# Patient Record
Sex: MALE
[De-identification: names, ages, dates, MRNs, and addresses within clinical notes are randomized per-mention and may not be internally consistent; named-entity substitution may affect disease eponyms.]

---

## 2017-02-09 ENCOUNTER — LAB SERVICES (OUTPATIENT)
Dept: OTHER | Age: 32
End: 2017-02-09

## 2017-02-09 ENCOUNTER — CHARTING TRANS (OUTPATIENT)
Dept: OTHER | Age: 32
End: 2017-02-09

## 2017-02-09 LAB — RAPID STREP GROUP A: POSITIVE

## 2017-03-20 ENCOUNTER — OFFICE VISIT (OUTPATIENT)
Dept: INTERNAL MEDICINE CLINIC | Facility: CLINIC | Age: 32
End: 2017-03-20

## 2017-03-20 VITALS
HEART RATE: 84 BPM | HEIGHT: 73 IN | SYSTOLIC BLOOD PRESSURE: 134 MMHG | WEIGHT: 315 LBS | DIASTOLIC BLOOD PRESSURE: 84 MMHG | BODY MASS INDEX: 41.75 KG/M2

## 2017-03-20 DIAGNOSIS — I10 ESSENTIAL HYPERTENSION: Primary | ICD-10-CM

## 2017-03-20 DIAGNOSIS — E66.01 MORBID OBESITY, UNSPECIFIED OBESITY TYPE (HCC): ICD-10-CM

## 2017-03-20 DIAGNOSIS — N20.0 RECURRENT KIDNEY STONES: ICD-10-CM

## 2017-03-20 PROCEDURE — 99213 OFFICE O/P EST LOW 20 MIN: CPT | Performed by: INTERNAL MEDICINE

## 2017-03-20 PROCEDURE — 99212 OFFICE O/P EST SF 10 MIN: CPT | Performed by: INTERNAL MEDICINE

## 2017-03-20 RX ORDER — AMLODIPINE BESYLATE 10 MG/1
10 TABLET ORAL DAILY
Qty: 30 TABLET | Refills: 5 | Status: SHIPPED | OUTPATIENT
Start: 2017-03-20 | End: 2017-09-18

## 2017-03-20 RX ORDER — LOSARTAN POTASSIUM 100 MG/1
100 TABLET ORAL DAILY
Qty: 30 TABLET | Refills: 5 | Status: SHIPPED | OUTPATIENT
Start: 2017-03-20 | End: 2017-09-18

## 2017-03-20 NOTE — PATIENT INSTRUCTIONS
Please continue your current medications. Please obtain blood tests soon when you can. Please try to watch your diet, exercise regularly, and lose weight. Return visit in 6 months.

## 2017-03-20 NOTE — PROGRESS NOTES
Aviva Mart. is a 36year old male. Patient presents with:  Hypertension    HPI:   Mr. Kenny Gilman presents this afternoon for follow-up of hypertension. He has not been able to obtain previous medical records. \"I feel great. \"  No out of office Comp Metabolic Panel (14); Future    2. Morbid obesity, unspecified obesity type ([address]  Lifestyle advice as above. - Lipid Panel; Future  - TSH W Reflex; Future    3. Recurrent kidney stones  Currently inactive. Await labs. - Uric Acid;  Future      The

## 2017-04-28 ENCOUNTER — APPOINTMENT (OUTPATIENT)
Dept: LAB | Facility: HOSPITAL | Age: 32
End: 2017-04-28
Attending: INTERNAL MEDICINE
Payer: COMMERCIAL

## 2017-04-28 DIAGNOSIS — I10 ESSENTIAL HYPERTENSION: ICD-10-CM

## 2017-04-28 DIAGNOSIS — E66.01 MORBID OBESITY, UNSPECIFIED OBESITY TYPE (HCC): ICD-10-CM

## 2017-04-28 DIAGNOSIS — N20.0 RECURRENT KIDNEY STONES: ICD-10-CM

## 2017-04-28 PROBLEM — E78.5 DYSLIPIDEMIA: Status: ACTIVE | Noted: 2017-04-28

## 2017-04-28 PROCEDURE — 84443 ASSAY THYROID STIM HORMONE: CPT

## 2017-04-28 PROCEDURE — 36415 COLL VENOUS BLD VENIPUNCTURE: CPT

## 2017-04-28 PROCEDURE — 80061 LIPID PANEL: CPT

## 2017-04-28 PROCEDURE — 84550 ASSAY OF BLOOD/URIC ACID: CPT

## 2017-04-28 PROCEDURE — 80053 COMPREHEN METABOLIC PANEL: CPT

## 2017-08-04 ENCOUNTER — OFFICE VISIT (OUTPATIENT)
Dept: INTERNAL MEDICINE CLINIC | Facility: CLINIC | Age: 32
End: 2017-08-04

## 2017-08-04 ENCOUNTER — LAB ENCOUNTER (OUTPATIENT)
Dept: LAB | Age: 32
End: 2017-08-04
Attending: INTERNAL MEDICINE
Payer: COMMERCIAL

## 2017-08-04 ENCOUNTER — TELEPHONE (OUTPATIENT)
Dept: INTERNAL MEDICINE CLINIC | Facility: CLINIC | Age: 32
End: 2017-08-04

## 2017-08-04 VITALS
HEART RATE: 92 BPM | BODY MASS INDEX: 49 KG/M2 | WEIGHT: 315 LBS | SYSTOLIC BLOOD PRESSURE: 155 MMHG | DIASTOLIC BLOOD PRESSURE: 106 MMHG

## 2017-08-04 DIAGNOSIS — R53.83 OTHER FATIGUE: Primary | ICD-10-CM

## 2017-08-04 DIAGNOSIS — M79.10 MYALGIA: ICD-10-CM

## 2017-08-04 DIAGNOSIS — R53.83 OTHER FATIGUE: ICD-10-CM

## 2017-08-04 DIAGNOSIS — F41.9 ANXIETY: ICD-10-CM

## 2017-08-04 LAB
ALBUMIN SERPL BCP-MCNC: 4.1 G/DL (ref 3.5–4.8)
ALBUMIN/GLOB SERPL: 1.2 {RATIO} (ref 1–2)
ALP SERPL-CCNC: 70 U/L (ref 32–100)
ALT SERPL-CCNC: 53 U/L (ref 17–63)
ANION GAP SERPL CALC-SCNC: 5 MMOL/L (ref 0–18)
AST SERPL-CCNC: 28 U/L (ref 15–41)
BASOPHILS # BLD: 0.1 K/UL (ref 0–0.2)
BASOPHILS NFR BLD: 1 %
BILIRUB SERPL-MCNC: 1 MG/DL (ref 0.3–1.2)
BUN SERPL-MCNC: 12 MG/DL (ref 8–20)
BUN/CREAT SERPL: 14.1 (ref 10–20)
CALCIUM SERPL-MCNC: 9.3 MG/DL (ref 8.5–10.5)
CHLORIDE SERPL-SCNC: 106 MMOL/L (ref 95–110)
CK SERPL-CCNC: 104 U/L (ref 49–397)
CO2 SERPL-SCNC: 29 MMOL/L (ref 22–32)
CREAT SERPL-MCNC: 0.85 MG/DL (ref 0.5–1.5)
EOSINOPHIL # BLD: 0.1 K/UL (ref 0–0.7)
EOSINOPHIL NFR BLD: 1 %
ERYTHROCYTE [DISTWIDTH] IN BLOOD BY AUTOMATED COUNT: 13.2 % (ref 11–15)
GLOBULIN PLAS-MCNC: 3.3 G/DL (ref 2.5–3.7)
GLUCOSE SERPL-MCNC: 166 MG/DL (ref 70–99)
HCT VFR BLD AUTO: 46.6 % (ref 41–52)
HGB BLD-MCNC: 15.8 G/DL (ref 13.5–17.5)
LYMPHOCYTES # BLD: 2.8 K/UL (ref 1–4)
LYMPHOCYTES NFR BLD: 30 %
MCH RBC QN AUTO: 31.6 PG (ref 27–32)
MCHC RBC AUTO-ENTMCNC: 34 G/DL (ref 32–37)
MCV RBC AUTO: 92.7 FL (ref 80–100)
MONOCYTES # BLD: 0.5 K/UL (ref 0–1)
MONOCYTES NFR BLD: 6 %
NEUTROPHILS # BLD AUTO: 5.9 K/UL (ref 1.8–7.7)
NEUTROPHILS NFR BLD: 63 %
OSMOLALITY UR CALC.SUM OF ELEC: 294 MOSM/KG (ref 275–295)
PLATELET # BLD AUTO: 201 K/UL (ref 140–400)
PMV BLD AUTO: 9.5 FL (ref 7.4–10.3)
POTASSIUM SERPL-SCNC: 4.2 MMOL/L (ref 3.3–5.1)
PROT SERPL-MCNC: 7.4 G/DL (ref 5.9–8.4)
RBC # BLD AUTO: 5.02 M/UL (ref 4.5–5.9)
SODIUM SERPL-SCNC: 140 MMOL/L (ref 136–144)
WBC # BLD AUTO: 9.4 K/UL (ref 4–11)

## 2017-08-04 PROCEDURE — 82550 ASSAY OF CK (CPK): CPT

## 2017-08-04 PROCEDURE — 99212 OFFICE O/P EST SF 10 MIN: CPT | Performed by: INTERNAL MEDICINE

## 2017-08-04 PROCEDURE — 80053 COMPREHEN METABOLIC PANEL: CPT

## 2017-08-04 PROCEDURE — 36415 COLL VENOUS BLD VENIPUNCTURE: CPT

## 2017-08-04 PROCEDURE — 85025 COMPLETE CBC W/AUTO DIFF WBC: CPT

## 2017-08-04 PROCEDURE — 99214 OFFICE O/P EST MOD 30 MIN: CPT | Performed by: INTERNAL MEDICINE

## 2017-08-04 RX ORDER — CLONAZEPAM 0.5 MG/1
0.5 TABLET ORAL 2 TIMES DAILY PRN
Qty: 20 TABLET | Refills: 0 | Status: SHIPPED | OUTPATIENT
Start: 2017-08-04 | End: 2019-01-18

## 2017-08-04 NOTE — TELEPHONE ENCOUNTER
Actions Requested: Office visit  Problem: fatigue  Onset and Timin week  Associated Symptoms: fatigue, achey joints  Aggravating by: n/a  Alleviated by: n/a  Triage Note: Patient states for about 1 week he has been feeling fatigued and weak, \"rundown\ vagina)  * Bloody, black, or tarry bowel movements  * Moderate weakness from poor fluid intake with no improvement after 2 hours of rest and fluids  * Drinking very little and dehydration suspected (e.g., no urine > 12 hours, very dry mouth, very lighthead

## 2017-08-04 NOTE — PROGRESS NOTES
HPI:    Patient ID: Citlalli Rossi. is a 36year old male. Patient complains of weakness and fatigue for 1 week. Denies fevers or chills but feels achy all over. Joints and muscles. No nausea vomiting. No change in bowel habits. No trauma. Creatinine) (E)    Meds This Visit:  Signed Prescriptions Disp Refills    ClonazePAM 0.5 MG Oral Tab 20 tablet 0      Sig: Take 1 tablet (0.5 mg total) by mouth 2 (two) times daily as needed for Anxiety.            Imaging & Referrals:  None       [id number]

## 2017-08-08 ENCOUNTER — TELEPHONE (OUTPATIENT)
Dept: FAMILY MEDICINE CLINIC | Facility: CLINIC | Age: 32
End: 2017-08-08

## 2017-08-08 DIAGNOSIS — R73.09 ABNORMAL GLUCOSE LEVEL: Primary | ICD-10-CM

## 2017-08-14 NOTE — TELEPHONE ENCOUNTER
Collected:  8/4/2017  1:56 PM Status:  Final result Dx:  Myalgia   Notes Recorded by Jorje Barrientos MD on 8/11/2017 at 4:45 PM CDT  Labs good except blood sugar mild to moderately elevated.  Repeat FBS and A1c. Sameer Wong               Pt called, message per Dr lao

## 2017-08-18 ENCOUNTER — APPOINTMENT (OUTPATIENT)
Dept: LAB | Facility: HOSPITAL | Age: 32
End: 2017-08-18
Attending: PATHOLOGY
Payer: COMMERCIAL

## 2017-08-18 DIAGNOSIS — R73.09 ABNORMAL GLUCOSE LEVEL: ICD-10-CM

## 2017-08-18 LAB
GLUCOSE SERPL-MCNC: 164 MG/DL (ref 70–99)
HBA1C MFR BLD: 8.1 % (ref 4–6)

## 2017-08-18 PROCEDURE — 83036 HEMOGLOBIN GLYCOSYLATED A1C: CPT

## 2017-08-18 PROCEDURE — 36415 COLL VENOUS BLD VENIPUNCTURE: CPT

## 2017-08-18 PROCEDURE — 82947 ASSAY GLUCOSE BLOOD QUANT: CPT

## 2017-09-18 ENCOUNTER — OFFICE VISIT (OUTPATIENT)
Dept: INTERNAL MEDICINE CLINIC | Facility: CLINIC | Age: 32
End: 2017-09-18

## 2017-09-18 VITALS
DIASTOLIC BLOOD PRESSURE: 99 MMHG | SYSTOLIC BLOOD PRESSURE: 148 MMHG | HEART RATE: 97 BPM | BODY MASS INDEX: 50 KG/M2 | WEIGHT: 315 LBS

## 2017-09-18 DIAGNOSIS — E66.01 MORBID OBESITY (HCC): ICD-10-CM

## 2017-09-18 DIAGNOSIS — I10 ESSENTIAL HYPERTENSION: Primary | ICD-10-CM

## 2017-09-18 PROCEDURE — 99212 OFFICE O/P EST SF 10 MIN: CPT | Performed by: INTERNAL MEDICINE

## 2017-09-18 PROCEDURE — 99214 OFFICE O/P EST MOD 30 MIN: CPT | Performed by: INTERNAL MEDICINE

## 2017-09-18 RX ORDER — AMLODIPINE BESYLATE 10 MG/1
10 TABLET ORAL DAILY
Qty: 30 TABLET | Refills: 5 | Status: SHIPPED | OUTPATIENT
Start: 2017-09-18 | End: 2018-08-31

## 2017-09-18 RX ORDER — LOSARTAN POTASSIUM 100 MG/1
100 TABLET ORAL DAILY
Qty: 30 TABLET | Refills: 5 | Status: SHIPPED | OUTPATIENT
Start: 2017-09-18 | End: 2018-08-31

## 2017-09-18 NOTE — PROGRESS NOTES
HPI:    Patient ID: Yessy Yun is a 36year old male. HTN   This is a chronic problem. The current episode started more than 1 year ago. The problem has been gradually improving (home bp readings are within goal) since onset.  The problem is c by mouth 2 (two) times daily as needed for Anxiety. Disp: 20 tablet Rfl: 0     Allergies:No Known Allergies       09/18/17  1603   BP: 148/99   Pulse: 97   Weight: (!) 377 lb (171 kg)         Body mass index is 49.74 kg/m².     PHYSICAL EXAM:   Physical Exa

## 2018-03-28 RX ORDER — LOSARTAN POTASSIUM 100 MG/1
TABLET ORAL
Qty: 30 TABLET | Refills: 4 | OUTPATIENT
Start: 2018-03-28

## 2018-03-28 RX ORDER — AMLODIPINE BESYLATE 10 MG/1
TABLET ORAL
Qty: 30 TABLET | Refills: 4 | OUTPATIENT
Start: 2018-03-28

## 2018-08-31 ENCOUNTER — OFFICE VISIT (OUTPATIENT)
Dept: INTERNAL MEDICINE CLINIC | Facility: CLINIC | Age: 33
End: 2018-08-31
Payer: COMMERCIAL

## 2018-08-31 ENCOUNTER — APPOINTMENT (OUTPATIENT)
Dept: LAB | Age: 33
End: 2018-08-31
Attending: INTERNAL MEDICINE
Payer: COMMERCIAL

## 2018-08-31 VITALS
WEIGHT: 315 LBS | DIASTOLIC BLOOD PRESSURE: 83 MMHG | BODY MASS INDEX: 49 KG/M2 | HEART RATE: 94 BPM | SYSTOLIC BLOOD PRESSURE: 148 MMHG | TEMPERATURE: 99 F

## 2018-08-31 DIAGNOSIS — I10 ESSENTIAL HYPERTENSION: ICD-10-CM

## 2018-08-31 DIAGNOSIS — I10 ESSENTIAL HYPERTENSION: Primary | ICD-10-CM

## 2018-08-31 DIAGNOSIS — R73.9 HYPERGLYCEMIA: ICD-10-CM

## 2018-08-31 DIAGNOSIS — E66.01 MORBID OBESITY (HCC): ICD-10-CM

## 2018-08-31 DIAGNOSIS — E78.5 DYSLIPIDEMIA: ICD-10-CM

## 2018-08-31 LAB
ALBUMIN SERPL BCP-MCNC: 3.9 G/DL (ref 3.5–4.8)
ALBUMIN/GLOB SERPL: 1.1 {RATIO} (ref 1–2)
ALP SERPL-CCNC: 74 U/L (ref 32–100)
ALT SERPL-CCNC: 47 U/L (ref 17–63)
ANION GAP SERPL CALC-SCNC: 7 MMOL/L (ref 0–18)
AST SERPL-CCNC: 27 U/L (ref 15–41)
BILIRUB SERPL-MCNC: 0.9 MG/DL (ref 0.3–1.2)
BUN SERPL-MCNC: 14 MG/DL (ref 8–20)
BUN/CREAT SERPL: 14.7 (ref 10–20)
CALCIUM SERPL-MCNC: 9.4 MG/DL (ref 8.5–10.5)
CHLORIDE SERPL-SCNC: 103 MMOL/L (ref 95–110)
CHOLEST SERPL-MCNC: 158 MG/DL (ref 110–200)
CO2 SERPL-SCNC: 28 MMOL/L (ref 22–32)
CREAT SERPL-MCNC: 0.95 MG/DL (ref 0.5–1.5)
GLOBULIN PLAS-MCNC: 3.6 G/DL (ref 2.5–3.7)
GLUCOSE SERPL-MCNC: 243 MG/DL (ref 70–99)
HDLC SERPL-MCNC: 34 MG/DL
LDLC SERPL CALC-MCNC: 104 MG/DL (ref 0–99)
NONHDLC SERPL-MCNC: 124 MG/DL
OSMOLALITY UR CALC.SUM OF ELEC: 295 MOSM/KG (ref 275–295)
PATIENT FASTING: YES
POTASSIUM SERPL-SCNC: 4.6 MMOL/L (ref 3.3–5.1)
PROT SERPL-MCNC: 7.5 G/DL (ref 5.9–8.4)
SODIUM SERPL-SCNC: 138 MMOL/L (ref 136–144)
TRIGL SERPL-MCNC: 102 MG/DL (ref 1–149)
TSH SERPL-ACNC: 0.74 UIU/ML (ref 0.45–5.33)

## 2018-08-31 PROCEDURE — 83036 HEMOGLOBIN GLYCOSYLATED A1C: CPT

## 2018-08-31 PROCEDURE — 36415 COLL VENOUS BLD VENIPUNCTURE: CPT

## 2018-08-31 PROCEDURE — 99212 OFFICE O/P EST SF 10 MIN: CPT | Performed by: INTERNAL MEDICINE

## 2018-08-31 PROCEDURE — 99214 OFFICE O/P EST MOD 30 MIN: CPT | Performed by: INTERNAL MEDICINE

## 2018-08-31 PROCEDURE — 84443 ASSAY THYROID STIM HORMONE: CPT

## 2018-08-31 PROCEDURE — 80053 COMPREHEN METABOLIC PANEL: CPT

## 2018-08-31 PROCEDURE — 80061 LIPID PANEL: CPT

## 2018-08-31 RX ORDER — LOSARTAN POTASSIUM 100 MG/1
100 TABLET ORAL DAILY
Qty: 90 TABLET | Refills: 1 | Status: SHIPPED | OUTPATIENT
Start: 2018-08-31 | End: 2019-03-08

## 2018-08-31 RX ORDER — AMLODIPINE BESYLATE 10 MG/1
10 TABLET ORAL DAILY
Qty: 90 TABLET | Refills: 1 | Status: SHIPPED | OUTPATIENT
Start: 2018-08-31 | End: 2019-03-08

## 2018-08-31 NOTE — H&P
[facility]    History and Physical    Valeria Rodriguez. Patient Status:  Outpatient    10/15/1985 MRN [id number]   Location [address] Attending No att. providers found   [facility] Day # 0 PCP LIVAN Yun 0.99 04/28/2017    08/04/2017               Assessment/Plan:     * No active hospital problems.  Nova Henson MD  8/31/2018

## 2018-08-31 NOTE — PROGRESS NOTES
HPI:    Patient ID: Vargas Mata. is a 29year old male. Hypertension   This is a chronic problem. The current episode started more than 1 year ago. The problem is unchanged. The problem is controlled.  Pertinent negatives include no chest pain o tablet Rfl: 1   ClonazePAM 0.5 MG Oral Tab Take 1 tablet (0.5 mg total) by mouth 2 (two) times daily as needed for Anxiety.  Disp: 20 tablet Rfl: 0     Allergies:No Known Allergies     08/31/18  1019   BP: 148/83   Pulse: 94   Temp: 98.8 °F (37.1 °C)   Temp

## 2018-09-01 LAB — HBA1C MFR BLD: 9.9 % (ref 4–6)

## 2018-09-14 ENCOUNTER — APPOINTMENT (OUTPATIENT)
Dept: OTHER | Facility: HOSPITAL | Age: 33
End: 2018-09-14
Attending: EMERGENCY MEDICINE

## 2018-09-21 ENCOUNTER — OFFICE VISIT (OUTPATIENT)
Dept: INTERNAL MEDICINE CLINIC | Facility: CLINIC | Age: 33
End: 2018-09-21
Payer: COMMERCIAL

## 2018-09-21 VITALS
HEIGHT: 74 IN | HEART RATE: 82 BPM | DIASTOLIC BLOOD PRESSURE: 95 MMHG | WEIGHT: 315 LBS | SYSTOLIC BLOOD PRESSURE: 145 MMHG | BODY MASS INDEX: 40.43 KG/M2

## 2018-09-21 DIAGNOSIS — IMO0001 UNCONTROLLED TYPE 2 DIABETES MELLITUS WITHOUT COMPLICATION, WITHOUT LONG-TERM CURRENT USE OF INSULIN: Primary | ICD-10-CM

## 2018-09-21 DIAGNOSIS — R31.9 HEMATURIA, UNSPECIFIED TYPE: ICD-10-CM

## 2018-09-21 DIAGNOSIS — I10 ESSENTIAL HYPERTENSION: ICD-10-CM

## 2018-09-21 LAB
APPEARANCE: CLEAR
GLUCOSE (URINE DIPSTICK): 500 MG/DL
MULTISTIX LOT#: NORMAL NUMERIC
PH, URINE: 6 (ref 4.5–8)
SPECIFIC GRAVITY: 1.02 (ref 1–1.03)
URINE-COLOR: YELLOW
UROBILINOGEN,SEMI-QN: 0.2 MG/DL (ref 0–1.9)

## 2018-09-21 PROCEDURE — 99212 OFFICE O/P EST SF 10 MIN: CPT | Performed by: INTERNAL MEDICINE

## 2018-09-21 PROCEDURE — 99214 OFFICE O/P EST MOD 30 MIN: CPT | Performed by: INTERNAL MEDICINE

## 2018-09-21 PROCEDURE — 93000 ELECTROCARDIOGRAM COMPLETE: CPT | Performed by: INTERNAL MEDICINE

## 2018-09-21 PROCEDURE — 81003 URINALYSIS AUTO W/O SCOPE: CPT | Performed by: INTERNAL MEDICINE

## 2018-09-21 PROCEDURE — 93005 ELECTROCARDIOGRAM TRACING: CPT | Performed by: INTERNAL MEDICINE

## 2018-09-21 RX ORDER — METFORMIN HYDROCHLORIDE 500 MG/1
500 TABLET, EXTENDED RELEASE ORAL DAILY
Qty: 30 TABLET | Refills: 0 | Status: SHIPPED | OUTPATIENT
Start: 2018-09-21 | End: 2018-12-11

## 2018-09-21 NOTE — PROGRESS NOTES
HPI:    Patient ID: Cristy Haas. is a 29year old male. Diabetes   He presents for his initial diabetic visit. He has type 2 diabetes mellitus. Pertinent negatives for hypoglycemia include no nervousness/anxiousness or speech difficulty.  There Mother    • Hypertension Mother    • Diabetes Maternal Grandmother    • Prostate Cancer Maternal Grandfather       Social History    Tobacco Use      Smoking status: Never Smoker      Smokeless tobacco: Never Used    Alcohol use:  Yes      Alcohol/week: 0.0 04/28/2017     Lab Results   Component Value Date    HDL 34 08/31/2018    HDL 32 04/28/2017     Lab Results   Component Value Date     (H) 08/31/2018     (H) 04/28/2017     Lab Results   Component Value Date    TRIG 102 08/31/2018    TRIG 102 under the direction and in the presence of LIVAN Li MD.   Electronically Signed: Carrie Storm, 9/21/2018, 10:29 AM.    ILIVAN MD,  personally performed the services described in this documentation.  All medical record entries made by raven

## 2018-10-12 ENCOUNTER — OFFICE VISIT (OUTPATIENT)
Dept: PULMONOLOGY | Facility: CLINIC | Age: 33
End: 2018-10-12
Payer: COMMERCIAL

## 2018-10-12 VITALS
DIASTOLIC BLOOD PRESSURE: 98 MMHG | WEIGHT: 315 LBS | SYSTOLIC BLOOD PRESSURE: 160 MMHG | RESPIRATION RATE: 18 BRPM | HEIGHT: 74 IN | BODY MASS INDEX: 40.43 KG/M2 | HEART RATE: 103 BPM | OXYGEN SATURATION: 97 %

## 2018-10-12 DIAGNOSIS — G47.10 HYPERSOMNIA: Primary | ICD-10-CM

## 2018-10-12 PROCEDURE — 99212 OFFICE O/P EST SF 10 MIN: CPT | Performed by: INTERNAL MEDICINE

## 2018-10-12 PROCEDURE — 99244 OFF/OP CNSLTJ NEW/EST MOD 40: CPT | Performed by: INTERNAL MEDICINE

## 2018-10-12 NOTE — H&P
Referring Physician  LIVAN Monroe MD    Chief Complaint  Sleep apnea evaluation    History of Present Illness  Patient is a 29-year-old male who presents today for evaluation of underlying possible obstructive sleep apnea.   He denies any prior history o Allergies    Physical Exam  Constitutional: no acute distress  HEENT: PERRL  Cardio: RRR, S1 S2  Respiratory: clear to auscultation bilaterally, no wheezing, rales, rhonchi, crackles  GI: abdomen soft, non tender  Extremities: no clubbing, cyanosis, edema

## 2018-10-25 ENCOUNTER — TELEPHONE (OUTPATIENT)
Dept: PULMONOLOGY | Facility: CLINIC | Age: 33
End: 2018-10-25

## 2018-10-25 NOTE — TELEPHONE ENCOUNTER
Pt informed psg order was placed day of visit, per notes in referral no preauthorization needed for insurance. Pt instructed to call sleep lab to schedule. Pt asks for phone # be sent via [employer], msg sent.

## 2018-11-05 VITALS
DIASTOLIC BLOOD PRESSURE: 86 MMHG | RESPIRATION RATE: 16 BRPM | BODY MASS INDEX: 41.75 KG/M2 | TEMPERATURE: 99.7 F | HEART RATE: 76 BPM | HEIGHT: 73 IN | WEIGHT: 315 LBS | SYSTOLIC BLOOD PRESSURE: 130 MMHG

## 2018-11-10 ENCOUNTER — OFFICE VISIT (OUTPATIENT)
Dept: SLEEP CENTER | Age: 33
End: 2018-11-10
Attending: INTERNAL MEDICINE
Payer: COMMERCIAL

## 2018-11-10 DIAGNOSIS — Z76.89 SLEEP CONCERN: Primary | ICD-10-CM

## 2018-11-10 DIAGNOSIS — G47.10 HYPERSOMNIA: ICD-10-CM

## 2018-11-10 PROCEDURE — 95811 POLYSOM 6/>YRS CPAP 4/> PARM: CPT

## 2018-11-10 PROCEDURE — 95810 POLYSOM 6/> YRS 4/> PARAM: CPT

## 2018-11-12 NOTE — PROCEDURES
[facility]  Accredited by the [place] of Sleep Medicine (AASM)    PATIENT'S NAME: [facility]   ATTENDING PHYSICIAN: Doc Hodgkins. Diogo Patel DO   REFERRING PHYSICIAN: Doc Hodgkins.  Kelsie Maria #: [de-identified] LOC There were 118 periodic limb movements, for a periodic limb movement index of 21 events per hour, of which 0.9 per hour were associated with arousal.  The lowest desaturation was to 66%, the average heart rate was 79 beats per minute, and there was no sign

## 2018-11-14 ENCOUNTER — TELEPHONE (OUTPATIENT)
Dept: PULMONOLOGY | Facility: CLINIC | Age: 33
End: 2018-11-14

## 2018-11-14 NOTE — TELEPHONE ENCOUNTER
Spoke with pt. Informed him of [facility] message below. Pt states he felt very claustrophobic when doing the sleep study. Pt states he would like to see Candelario Monroy in clinic asap because his medical card will be expiring around 12/19. No available appointments.  D

## 2018-11-14 NOTE — TELEPHONE ENCOUNTER
----- Message from Savanna Ray DO sent at 11/13/2018  3:00 PM CST -----  You may let the patient know that his sleep study results revealed severe obstructive sleep apnea.   I highly recommend CPAP titration to be performed if he is agreeable let me

## 2018-11-14 NOTE — TELEPHONE ENCOUNTER
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

## 2018-11-16 NOTE — TELEPHONE ENCOUNTER
Pt called enrique back attempt to transfer w no answer pt is tired of playing phone tag.  He mentioned something about Tuesday 930/1030 states book one or the other and call confirming it

## 2018-11-16 NOTE — TELEPHONE ENCOUNTER
Pt returning call thank you. Pt states he is currently at work. Pt requesting appt this week or next week.

## 2018-11-16 NOTE — TELEPHONE ENCOUNTER
Spoke with pt. Pt scheduled for 11/20 at 9:30. Pt aware of [facility] location.

## 2018-11-19 ENCOUNTER — TELEPHONE (OUTPATIENT)
Dept: PULMONOLOGY | Facility: CLINIC | Age: 33
End: 2018-11-19

## 2018-11-19 NOTE — TELEPHONE ENCOUNTER
Pt states that he can't make appt on 11/20/18 (see also 11/14/18 TE) and needs to reschedule. No appts available. Please call.

## 2018-11-19 NOTE — TELEPHONE ENCOUNTER
Spoke with pt. Pt will keep appointment for 11/27 at 10:10. Pt aware of [email or web address] location.

## 2018-11-24 ENCOUNTER — APPOINTMENT (OUTPATIENT)
Dept: LAB | Age: 33
End: 2018-11-24
Attending: INTERNAL MEDICINE
Payer: COMMERCIAL

## 2018-11-24 ENCOUNTER — OFFICE VISIT (OUTPATIENT)
Dept: INTERNAL MEDICINE CLINIC | Facility: CLINIC | Age: 33
End: 2018-11-24
Payer: COMMERCIAL

## 2018-11-24 VITALS
HEART RATE: 99 BPM | OXYGEN SATURATION: 97 % | HEIGHT: 73 IN | TEMPERATURE: 99 F | WEIGHT: 315 LBS | SYSTOLIC BLOOD PRESSURE: 146 MMHG | DIASTOLIC BLOOD PRESSURE: 86 MMHG | BODY MASS INDEX: 41.75 KG/M2

## 2018-11-24 DIAGNOSIS — I10 ESSENTIAL HYPERTENSION: ICD-10-CM

## 2018-11-24 DIAGNOSIS — E11.65 UNCONTROLLED TYPE 2 DIABETES MELLITUS WITH HYPERGLYCEMIA (HCC): Primary | ICD-10-CM

## 2018-11-24 DIAGNOSIS — J01.10 ACUTE NON-RECURRENT FRONTAL SINUSITIS: ICD-10-CM

## 2018-11-24 DIAGNOSIS — E66.01 MORBID OBESITY (HCC): ICD-10-CM

## 2018-11-24 DIAGNOSIS — E11.65 UNCONTROLLED TYPE 2 DIABETES MELLITUS WITH HYPERGLYCEMIA (HCC): ICD-10-CM

## 2018-11-24 PROCEDURE — 80053 COMPREHEN METABOLIC PANEL: CPT

## 2018-11-24 PROCEDURE — 83036 HEMOGLOBIN GLYCOSYLATED A1C: CPT

## 2018-11-24 PROCEDURE — 80061 LIPID PANEL: CPT

## 2018-11-24 PROCEDURE — 99214 OFFICE O/P EST MOD 30 MIN: CPT | Performed by: INTERNAL MEDICINE

## 2018-11-24 PROCEDURE — 36415 COLL VENOUS BLD VENIPUNCTURE: CPT

## 2018-11-24 RX ORDER — FLUTICASONE PROPIONATE 50 MCG
2 SPRAY, SUSPENSION (ML) NASAL DAILY
Qty: 3 BOTTLE | Refills: 3 | Status: SHIPPED | OUTPATIENT
Start: 2018-11-24 | End: 2019-01-18

## 2018-11-24 NOTE — PROGRESS NOTES
HPI:    Patient ID: Grady Whitlock. is a 34year old male. Diabetes   He presents for his follow-up diabetic visit. Disease course: 8/31/18 HgA1c 9.9. Pertinent negatives for hypoglycemia include no speech difficulty.  Pertinent negatives for diabe Neurological: Negative for speech difficulty. HISTORY:  Past Medical History:   Diagnosis Date   • Dyslipidemia     Low HDL   • Essential hypertension    • Morbid obesity ([facility] [address]    • Recurrent kidney stones       History reviewed.  No pertinent surgica Cardiovascular: Normal rate, regular rhythm and normal heart sounds. Exam reveals no gallop and no friction rub. No murmur heard. Pulmonary/Chest: Effort normal and breath sounds normal. No respiratory distress. He has no wheezes. He has no rales.    Matt Spivey -Pt will follow up in 3-4 weeks for further evaluation of treatment      On exam, blood pressure 150/101- uncontrolled. Patient denies associated symptoms such as HA, N/V, or CP.  Patient is currently taking AmLODIPine Besylate 10 MG and losartan 100 mg wit

## 2018-11-27 ENCOUNTER — OFFICE VISIT (OUTPATIENT)
Dept: PULMONOLOGY | Facility: CLINIC | Age: 33
End: 2018-11-27
Payer: COMMERCIAL

## 2018-11-27 VITALS
BODY MASS INDEX: 40.43 KG/M2 | OXYGEN SATURATION: 98 % | HEIGHT: 74 IN | WEIGHT: 315 LBS | SYSTOLIC BLOOD PRESSURE: 140 MMHG | DIASTOLIC BLOOD PRESSURE: 85 MMHG | HEART RATE: 98 BPM | RESPIRATION RATE: 16 BRPM

## 2018-11-27 DIAGNOSIS — G47.33 OSA (OBSTRUCTIVE SLEEP APNEA): Primary | ICD-10-CM

## 2018-11-27 PROCEDURE — 99213 OFFICE O/P EST LOW 20 MIN: CPT | Performed by: INTERNAL MEDICINE

## 2018-11-27 PROCEDURE — 99212 OFFICE O/P EST SF 10 MIN: CPT | Performed by: INTERNAL MEDICINE

## 2018-11-27 NOTE — PROGRESS NOTES
Referring Physician  C. Auther Boast, MD    History of Present Illness  Patient seen today for follow-up visit at pulmonary clinic. States he did not tolerate CPAP portion of titration study. He is however agreeable to using auto CPAP device.   He admits t return between 30 and 90 days to review download data in regards to compliance and efficacy.     Follow Up  In 30-90 days    Viktoria George DO  Pulmonary Medicine  [facility], [facility]  11/27/2018  12:14 PM

## 2018-11-27 NOTE — PATIENT INSTRUCTIONS
[facility]     CPAP DESENSITIZATION STEPS        1)  Wear the mask at home, without the CPAP machine, while awake for one hour each day. 2)  Attach the mask to the CPAP device, and switch the unit \"on\".   Practice breathing jesús

## 2018-12-06 ENCOUNTER — TELEPHONE (OUTPATIENT)
Dept: PULMONOLOGY | Facility: CLINIC | Age: 33
End: 2018-12-06

## 2018-12-06 NOTE — TELEPHONE ENCOUNTER
Pt states he has not rec'vd call from vendor re: Cpap and Cpap Supplies orders. Pls call. Thank you.

## 2018-12-07 NOTE — TELEPHONE ENCOUNTER
Spoke with pt. Who was informed to contact [facility]. Pt was given [facility] [place] to F/U [phone].

## 2018-12-11 RX ORDER — METFORMIN HYDROCHLORIDE 500 MG/1
TABLET, EXTENDED RELEASE ORAL
Qty: 30 TABLET | Refills: 0 | Status: SHIPPED | OUTPATIENT
Start: 2018-12-11 | End: 2019-01-06

## 2018-12-11 NOTE — TELEPHONE ENCOUNTER
Pt states per HME an approval is needed. No further details given. Please call HME. Pt is very anxious and upset regarding time frame of process. Pt states he needs situation to be handled today. Pt states medical card expires Friday.  Please call pt with u

## 2018-12-12 NOTE — TELEPHONE ENCOUNTER
Pt informed of letter and he can  letter at  [facility] [address]. Pt verbalized understanding. Letter and sleep study left at  [address] [facility] [address].

## 2018-12-12 NOTE — TELEPHONE ENCOUNTER
You may let the patient know that I wrote a letter stating the following. He may pick it up. Taco Ami has been under my care since October 12, 2018. He had sleep study performed with evidence of obstructive sleep apnea.   I most recently saw

## 2018-12-14 ENCOUNTER — APPOINTMENT (OUTPATIENT)
Dept: OTHER | Facility: HOSPITAL | Age: 33
End: 2018-12-14
Attending: EMERGENCY MEDICINE

## 2018-12-14 ENCOUNTER — TELEPHONE (OUTPATIENT)
Dept: INTERNAL MEDICINE CLINIC | Facility: CLINIC | Age: 33
End: 2018-12-14

## 2018-12-14 NOTE — TELEPHONE ENCOUNTER
Pt called in to f/u on his request.  Pt stated that his appointment is in 1 hour. Please advise pt when the note is ready.

## 2018-12-14 NOTE — TELEPHONE ENCOUNTER
Pt is calling to f/u on msg below. He is demanding the papers to be ready by 1:05 PM. He state he will be there at the office. Please advise.  Thank you

## 2018-12-14 NOTE — TELEPHONE ENCOUNTER
Pt walked into office for letter. Dr. Swetha Villa verbally informed and per Dr. Swetha Villa verbal ok to provide pt with letter. 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 Dr. Swetha Villa did review and gave me verbal permission to proceed with letter, which he reviewed & he signed and I handed to pt along with a copy of EKG tracing. Message to Dr. Swetha Villa so he can sign off.

## 2018-12-14 NOTE — TELEPHONE ENCOUNTER
Pt called requesting to speak with office about letter for pt to drive. Pt states that he needs this letter by today.

## 2018-12-14 NOTE — TELEPHONE ENCOUNTER
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 Pt stts letter has to stat he is compliment with tx and is seen in office at least 2 times a year and is cleared to drive commercial vehicle. Pt also needs recent EKG tracing faxed to his home @ [phone]. Please advise on letter.

## 2018-12-14 NOTE — TELEPHONE ENCOUNTER
Also side pt was advised to schedule fup appt with Dr. Erik Beach to discuss labs. Pt voiced understanding and will schedule.

## 2019-01-10 ENCOUNTER — TELEPHONE (OUTPATIENT)
Dept: PULMONOLOGY | Facility: CLINIC | Age: 34
End: 2019-01-10

## 2019-01-10 ENCOUNTER — TELEPHONE (OUTPATIENT)
Dept: INTERNAL MEDICINE CLINIC | Facility: CLINIC | Age: 34
End: 2019-01-10

## 2019-01-10 RX ORDER — METFORMIN HYDROCHLORIDE 500 MG/1
TABLET, EXTENDED RELEASE ORAL
Qty: 30 TABLET | Refills: 2 | Status: SHIPPED | OUTPATIENT
Start: 2019-01-10 | End: 2019-01-19

## 2019-01-10 NOTE — TELEPHONE ENCOUNTER
Pt stated he needs 2 doctors notes for work. One is for the blood and urine.  The note pt got from work Valentino Bloom your MD write a note stating that this issue is being addressed\"    Second note must state that the Rx for clonazepam has been disconti

## 2019-01-11 NOTE — TELEPHONE ENCOUNTER
Pt appeared at [address] [place] [address] location today requesting note. [address] in clinic today and was able to provide pt with note.

## 2019-01-18 ENCOUNTER — OFFICE VISIT (OUTPATIENT)
Dept: PULMONOLOGY | Facility: CLINIC | Age: 34
End: 2019-01-18
Payer: COMMERCIAL

## 2019-01-18 VITALS
OXYGEN SATURATION: 98 % | HEIGHT: 74 IN | DIASTOLIC BLOOD PRESSURE: 96 MMHG | WEIGHT: 315 LBS | SYSTOLIC BLOOD PRESSURE: 158 MMHG | RESPIRATION RATE: 17 BRPM | TEMPERATURE: 98 F | BODY MASS INDEX: 40.43 KG/M2 | HEART RATE: 89 BPM

## 2019-01-18 DIAGNOSIS — G47.33 OSA (OBSTRUCTIVE SLEEP APNEA): Primary | ICD-10-CM

## 2019-01-18 PROCEDURE — 99212 OFFICE O/P EST SF 10 MIN: CPT | Performed by: INTERNAL MEDICINE

## 2019-01-18 PROCEDURE — 99213 OFFICE O/P EST LOW 20 MIN: CPT | Performed by: INTERNAL MEDICINE

## 2019-01-18 NOTE — PROGRESS NOTES
Referring Physician  LIVAN Rios MD    History of Present Illness  Patient seen today for follow-up visit at pulmonary clinic. Patient has been using his CPAP device with significant improvement in hypersomnia and fatigue.   He is tolerating his device

## 2019-01-19 ENCOUNTER — OFFICE VISIT (OUTPATIENT)
Dept: INTERNAL MEDICINE CLINIC | Facility: CLINIC | Age: 34
End: 2019-01-19
Payer: COMMERCIAL

## 2019-01-19 VITALS
WEIGHT: 315 LBS | DIASTOLIC BLOOD PRESSURE: 87 MMHG | BODY MASS INDEX: 40.43 KG/M2 | TEMPERATURE: 98 F | SYSTOLIC BLOOD PRESSURE: 139 MMHG | HEIGHT: 74 IN | HEART RATE: 92 BPM

## 2019-01-19 DIAGNOSIS — E11.65 UNCONTROLLED TYPE 2 DIABETES MELLITUS WITH HYPERGLYCEMIA (HCC): Primary | ICD-10-CM

## 2019-01-19 DIAGNOSIS — Z71.82 EXERCISE COUNSELING: ICD-10-CM

## 2019-01-19 DIAGNOSIS — Z71.3 DIETARY COUNSELING: ICD-10-CM

## 2019-01-19 PROCEDURE — 99212 OFFICE O/P EST SF 10 MIN: CPT | Performed by: INTERNAL MEDICINE

## 2019-01-19 PROCEDURE — 99213 OFFICE O/P EST LOW 20 MIN: CPT | Performed by: INTERNAL MEDICINE

## 2019-01-19 RX ORDER — METFORMIN HYDROCHLORIDE 500 MG/1
1000 TABLET, EXTENDED RELEASE ORAL
Qty: 60 TABLET | Refills: 0 | Status: SHIPPED | OUTPATIENT
Start: 2019-01-19 | End: 2019-02-18 | Stop reason: WASHOUT

## 2019-01-19 NOTE — PATIENT INSTRUCTIONS
Using a Blood Sugar Log    You have diabetes. This means your body has trouble regulating a sugar called glucose. To help manage your diabetes, you’ll need to check your blood sugar level as directed by your healthcare provider.  Keeping a log of your blo Tracking your blood sugar readings helps you see patterns. These patterns tell you how your actions affect your blood sugar. For instance, you may have higher numbers after eating certain foods or lower numbers after exercise.  They just help you understand You can check your blood sugar at home, at work, and anywhere else. Your diabetes team will help you choose a blood glucose meter. A meter measures the amount of glucose in a tiny drop of blood. You’ll use a device called a lancet to draw a drop of blood. Step 2. Draw a drop of blood  · Prick the side of your finger with the lancet. Squeeze gently until you get a drop of blood. Squeezing too hard can cause an inaccurate reading. · Put the lancet in a special sharps container.  Ask your healthcare team where If daily activity is new to you, start slow and steady. Begin with 10 minutes of activity each day. Then work up to at least 150 minutes a week of physical activity. Don't let more than 2 days go by without being active.  When sitting for long periods of ti Being active may cause blood sugar to drop faster than usual. This is especially true if you take medicine to manage your blood sugar. But there are things you can do to help reduce the risk of accidental lows.  Keep these tips in mind:  · Always carry iden You have been treated for high blood sugar (hyperglycemia). This may be because of an infection or other illness, eating too many sweets or starches, or not taking enough insulin.   Home care  Monitor and write down your blood sugar level at least twice a d Understanding Carbohydrates, Fats, and Protein  Food is a source of fuel and nourishment for your body. It’s also a source of pleasure. Having diabetes doesn’t mean you have to eat special foods or give up desserts.  Instead, your dietitian can show you how · Polyunsaturated fats are mostly found in vegetable oils, such as corn, safflower, and soybean oils. They are also found in some seeds, nuts, and fish. Polyunsaturated fats lower LDL (unhealthy) cholesterol.  So, choosing them instead of saturated fats is Food is an important tool that you can use to control diabetes and stay healthy. Eating well-balanced meals in the correct amounts will help you control your blood glucose levels and prevent low blood sugar reactions.  It will also help you reduce the healt · Avoid added salt. It can contribute to high blood pressure, which can cause heart disease. People with diabetes already have a risk of high blood pressure and heart disease. · Stay at a healthy weight.  If you need to lose weight, cut down on your portio

## 2019-01-19 NOTE — PROGRESS NOTES
HPI:    Patient ID: Virginia Wilson. is a 34year old male. Chief Complaint: Diabetes (Pt is here for DM check up, pt taking Metformin 1 tab 500mg daily)    Last A1C 9.8. Currently on metformin XR 500mg daily for past 2 months.  Tolerating well, no si Essential hypertension     Morbid obesity (HCC)     Dyslipidemia     Myalgia     Fatigue     Anxiety        Social History:      reports that  has never smoked. he has never used smokeless tobacco. He reports that he drinks alcohol.  He reports that he d Cardiovascular: Normal rate and regular rhythm. Edema not present. Pulmonary/Chest: Effort normal and breath sounds normal. No respiratory distress. He has no wheezes. Abdominal: Soft. Bowel sounds are normal. He exhibits no distension.  There is no Preventive measures and further education provided to patient in after visit summary. Potential medication side effects discussed. All questions answered. Patient understands and agrees to follow directions and advice.  Patient asked to sign up for [facility] The strip goes into the meter first, then a drop of blood is placed on the tip of the strip. The meter then shows a reading that tells you the level of your blood sugar. Your readings should be in your target range as often as possible.  This means not too · [employer] [place] [phone] www.nei.nih.gov  · [address] www.hormone. org  Date Last Reviewed: 5/1/2016  © 7263-5215 [employer]. [address] [place], [address] [place] [address]. All rights reserved.  This informat Your blood sugar should be in your target range—not too high and not too low. A target range is where your blood sugar level is healthiest. Staying in this range as much as possible will help lower your risk for health problems (complications).  Your diabet · Wait for the meter to show a message or symbol that it is time to test.  · Touch the test strip to the drop of blood. · Be sure to follow the instructions included with meter. Step 4.  Read and record your results  · Wait for your meter to show the resu · Walk with a friend or a group to keep it interesting and fun.   · Try taking several short walks during the day to meet your daily activity goal.  A pedometer makes every step count  A pedometer is a small device that keeps track of how many steps you walker · Avoid being active for long periods in very hot or very cold weather. · Skip activity if you’re sick.     Notice how activity affects blood sugar  Physical activity is important when you have diabetes.  But you need to keep an eye on your blood sugar lev If you have high-blood-sugar symptoms, use a blood or urine test to find out what your blood sugar level is. If it is above your usual range, use the \"sliding scale\" regular insulin dose prescribed by your healthcare provider.  If you were not given a ran · Sugars occur naturally in foods such as fruit, milk, honey, and molasses. Sugars can also be added to many foods, from cereals and yogurt to candy and desserts. Sugars raise blood sugar. · Starches are found in bread, cereals, pasta, and dried beans.  Sulema Barthel · Hydrogenated oils and trans fats are formed when vegetable oils are processed into solid fats. They are found in many processed foods. Hydrogenated oils and trans fats raise LDL cholesterol and lower HDL (healthy) cholesterol.  They are not healthy for yo Talk with your healthcare provider before starting a diabetes diet or weight loss program. If you haven't talked with a dietitian yet, ask your provider for a referral. The following guidelines can help you succeed:  · Select foods from the 6 food groups b ? The American Diabetes Association [phone] www. diabetes. org  Date Last Reviewed: 8/1/2016  © 1564-2821 [employer]. [address] [facility], [address] [place] [address]. All rights reserved.  This information is not intended as a substitute for pro

## 2019-03-08 RX ORDER — AMLODIPINE BESYLATE 10 MG/1
TABLET ORAL
Qty: 90 TABLET | Refills: 0 | Status: SHIPPED | OUTPATIENT
Start: 2019-03-08 | End: 2019-06-07

## 2019-03-08 RX ORDER — LOSARTAN POTASSIUM 100 MG/1
TABLET ORAL
Qty: 90 TABLET | Refills: 0 | Status: SHIPPED | OUTPATIENT
Start: 2019-03-08 | End: 2019-06-07

## 2019-03-21 ENCOUNTER — TELEPHONE (OUTPATIENT)
Dept: INTERNAL MEDICINE CLINIC | Facility: CLINIC | Age: 34
End: 2019-03-21

## 2019-03-21 ENCOUNTER — TELEPHONE (OUTPATIENT)
Dept: PULMONOLOGY | Facility: CLINIC | Age: 34
End: 2019-03-21

## 2019-03-21 NOTE — TELEPHONE ENCOUNTER
Pt requesting dr watt stating that he is a current pt of [facility] and is seen every 6 mos for BARBARA follow up and is using Cpap. Pt also requesting copies of Cpap Downloads. Pls send through [address] For add'l questions pls call pt. Thank you.

## 2019-03-21 NOTE — TELEPHONE ENCOUNTER
Please see request below. I have pended a letter for you to review/sign. Please notify RNs when done. LOV dates: 1/18/19, 11/27/18, 10/12/18. Per your LOV note pt is compliant with therapy.      \" Review download data over the last 30 days with 87% usage a

## 2019-03-22 NOTE — TELEPHONE ENCOUNTER
Pt informed of letter sent through [address] but unable to send CPAP downloads through [address] Pt verbalized understanding and requested CPAP downloads to be mailed to him. Verified address. CPAP downloads mailed to address.

## 2019-03-26 NOTE — TELEPHONE ENCOUNTER
Spoke with pt stts he would like letter to state he has been seen for his Diabetes and HTN, has been compliant with meds and is seen every 6 months. Also has a H/o kidney stones. Pt would like letter sent to [place]. Please advise Dr. Mitchell Mendenhall.

## 2019-04-05 ENCOUNTER — TELEPHONE (OUTPATIENT)
Dept: PULMONOLOGY | Facility: CLINIC | Age: 34
End: 2019-04-05

## 2019-04-08 NOTE — TELEPHONE ENCOUNTER
Message left informing pt letter was left at the  [address] for  and to call back with any additional questions. abdominal pain

## 2019-04-09 DIAGNOSIS — E11.65 UNCONTROLLED TYPE 2 DIABETES MELLITUS WITH HYPERGLYCEMIA (HCC): ICD-10-CM

## 2019-04-09 RX ORDER — METFORMIN HYDROCHLORIDE 500 MG/1
1000 TABLET, EXTENDED RELEASE ORAL
Qty: 60 TABLET | Refills: 0 | Status: CANCELLED | OUTPATIENT
Start: 2019-04-09 | End: 2019-05-09

## 2019-04-09 RX ORDER — METFORMIN HYDROCHLORIDE 500 MG/1
500 TABLET, EXTENDED RELEASE ORAL
Qty: 90 TABLET | Refills: 1 | Status: SHIPPED | OUTPATIENT
Start: 2019-04-09 | End: 2019-06-22

## 2019-04-09 NOTE — TELEPHONE ENCOUNTER
Pt has DM follow up on 4/27. Pt is requesting refill until next appointment.          MetFORMIN HCl  MG Oral Tablet 24 Hr

## 2019-04-09 NOTE — TELEPHONE ENCOUNTER
Pt's last office visit 1/19 with Dr. Milan Wright who stated the following:     Plan:  \"Last A1C 9.9, recently started on metformin XR 500mg daily.  Will increase metformin to 1000mg at bedtime, if tolerating can increase to 1,500mg at bedtime after 1 week if no

## 2019-04-27 ENCOUNTER — OFFICE VISIT (OUTPATIENT)
Dept: INTERNAL MEDICINE CLINIC | Facility: CLINIC | Age: 34
End: 2019-04-27
Payer: COMMERCIAL

## 2019-04-27 VITALS
WEIGHT: 315 LBS | HEIGHT: 73 IN | DIASTOLIC BLOOD PRESSURE: 93 MMHG | SYSTOLIC BLOOD PRESSURE: 151 MMHG | HEART RATE: 83 BPM | BODY MASS INDEX: 41.75 KG/M2 | TEMPERATURE: 98 F

## 2019-04-27 DIAGNOSIS — E11.9 TYPE 2 DIABETES MELLITUS WITHOUT COMPLICATION, WITHOUT LONG-TERM CURRENT USE OF INSULIN (HCC): ICD-10-CM

## 2019-04-27 DIAGNOSIS — I10 ESSENTIAL HYPERTENSION: Primary | ICD-10-CM

## 2019-04-27 DIAGNOSIS — E78.5 DYSLIPIDEMIA: ICD-10-CM

## 2019-04-27 PROCEDURE — 99214 OFFICE O/P EST MOD 30 MIN: CPT | Performed by: INTERNAL MEDICINE

## 2019-04-27 PROCEDURE — 99212 OFFICE O/P EST SF 10 MIN: CPT | Performed by: INTERNAL MEDICINE

## 2019-04-27 NOTE — PROGRESS NOTES
HPI:    Patient ID: Barrera Rod. is a 34year old male. Patient presents with:  Diabetes: fup   Hypertension: fup     Diabetes   Pertinent negatives for hypoglycemia include no pallor, speech difficulty or tremors.  Associated symptoms include fat reports unhealthy diet on 2-week vacation recently. PMHx of sleep apnea. Review of Systems   Constitutional: Positive for fatigue. Negative for chills, diaphoresis, fever and unexpected weight change.    HENT: Negative for dental problem, facial swelling Oral Tab TAKE ONE TABLET BY MOUTH ONE TIME DAILY  Disp: 90 tablet Rfl: 0     Allergies:No Known Allergies   PHYSICAL EXAM:   Physical Exam   Constitutional: He is oriented to person, place, and time. He appears well-developed and well-nourished.    HENT: TRIG 102 04/28/2017     Lab Results   Component Value Date    AST 24 11/24/2018    AST 27 08/31/2018    AST 28 08/04/2017     Lab Results   Component Value Date    ALT 44 11/24/2018    ALT 47 08/31/2018    ALT 53 08/04/2017          ASSESSMENT/PLAN:   [employer] prescriptions requested or ordered in this encounter       Imaging & Referrals:  None       ID#[id number]  By signing my name below, Vickie Morel,  attest that this documentation has been prepared under the direction and in the presence of LIVAN Fatima,

## 2019-04-27 NOTE — PROGRESS NOTES
HPI:    Patient ID: Solange Sandoval. is a 34year old male. Patient presents with:  Diabetes: fup   Hypertension: fup     HPI  Diabetes  Patient presents today for continual treatment of chronic type II DM. 04/06/2019 HgA1C 9.7(H).  Patient denies any Respiratory: Positive for apnea (sleep). Negative for choking, shortness of breath and wheezing. Cardiovascular: Negative for chest pain. Gastrointestinal: Negative for diarrhea, nausea and vomiting.    Endocrine: Negative for polydipsia, polyphagia normal.   Left Ear: External ear normal.   Eyes: Pupils are equal, round, and reactive to light. Conjunctivae and EOM are normal.   Neck: Normal range of motion. Neck supple. Cardiovascular: Normal rate, regular rhythm and normal heart sounds.  Exam revea diagnosis)  Dyslipidemia  Type 2 diabetes mellitus without complication, without long-term current use of insulin (hcc)    (I10) Essential hypertension  (primary encounter diagnosis)  Plan: BP on arrival 151/93.  Pertinent negatives include no associatetd C MD,  personally performed the services described in this documentation. All medical record entries made by the scribe were at my direction and in my presence.   I have reviewed the chart and discharge instructions (if applicable) and agree that the record r

## 2019-06-07 RX ORDER — AMLODIPINE BESYLATE 10 MG/1
TABLET ORAL
Qty: 90 TABLET | Refills: 1 | Status: SHIPPED | OUTPATIENT
Start: 2019-06-07 | End: 2019-12-21

## 2019-06-07 RX ORDER — LOSARTAN POTASSIUM 100 MG/1
TABLET ORAL
Qty: 90 TABLET | Refills: 1 | Status: SHIPPED | OUTPATIENT
Start: 2019-06-07 | End: 2019-12-21

## 2019-06-22 ENCOUNTER — OFFICE VISIT (OUTPATIENT)
Dept: INTERNAL MEDICINE CLINIC | Facility: CLINIC | Age: 34
End: 2019-06-22
Payer: COMMERCIAL

## 2019-06-22 ENCOUNTER — APPOINTMENT (OUTPATIENT)
Dept: LAB | Age: 34
End: 2019-06-22
Attending: INTERNAL MEDICINE
Payer: COMMERCIAL

## 2019-06-22 VITALS
TEMPERATURE: 98 F | HEART RATE: 87 BPM | BODY MASS INDEX: 40.43 KG/M2 | SYSTOLIC BLOOD PRESSURE: 159 MMHG | WEIGHT: 315 LBS | DIASTOLIC BLOOD PRESSURE: 109 MMHG | HEIGHT: 74 IN

## 2019-06-22 DIAGNOSIS — E11.9 DIABETES MELLITUS TYPE 2, NONINSULIN DEPENDENT (HCC): Primary | ICD-10-CM

## 2019-06-22 DIAGNOSIS — E11.59 HYPERTENSION ASSOCIATED WITH DIABETES (HCC): ICD-10-CM

## 2019-06-22 DIAGNOSIS — E11.59 OBESITY, DIABETES, AND HYPERTENSION SYNDROME (HCC): ICD-10-CM

## 2019-06-22 DIAGNOSIS — E11.9 TYPE 2 DIABETES MELLITUS WITHOUT COMPLICATION, WITHOUT LONG-TERM CURRENT USE OF INSULIN (HCC): ICD-10-CM

## 2019-06-22 DIAGNOSIS — I15.2 HYPERTENSION ASSOCIATED WITH DIABETES (HCC): ICD-10-CM

## 2019-06-22 DIAGNOSIS — Z01.00 DIABETIC EYE EXAM (HCC): ICD-10-CM

## 2019-06-22 DIAGNOSIS — E66.9 OBESITY, DIABETES, AND HYPERTENSION SYNDROME (HCC): ICD-10-CM

## 2019-06-22 DIAGNOSIS — E11.69 OBESITY, DIABETES, AND HYPERTENSION SYNDROME (HCC): ICD-10-CM

## 2019-06-22 DIAGNOSIS — E78.5 DYSLIPIDEMIA: ICD-10-CM

## 2019-06-22 DIAGNOSIS — E11.9 DIABETIC EYE EXAM (HCC): ICD-10-CM

## 2019-06-22 DIAGNOSIS — I15.2 OBESITY, DIABETES, AND HYPERTENSION SYNDROME (HCC): ICD-10-CM

## 2019-06-22 DIAGNOSIS — E66.01 MORBID OBESITY (HCC): ICD-10-CM

## 2019-06-22 DIAGNOSIS — E11.9 DIABETES MELLITUS TYPE 2, NONINSULIN DEPENDENT (HCC): ICD-10-CM

## 2019-06-22 PROCEDURE — 80053 COMPREHEN METABOLIC PANEL: CPT

## 2019-06-22 PROCEDURE — 99212 OFFICE O/P EST SF 10 MIN: CPT | Performed by: INTERNAL MEDICINE

## 2019-06-22 PROCEDURE — 82043 UR ALBUMIN QUANTITATIVE: CPT

## 2019-06-22 PROCEDURE — 36415 COLL VENOUS BLD VENIPUNCTURE: CPT

## 2019-06-22 PROCEDURE — 80061 LIPID PANEL: CPT

## 2019-06-22 PROCEDURE — 82570 ASSAY OF URINE CREATININE: CPT

## 2019-06-22 PROCEDURE — 83036 HEMOGLOBIN GLYCOSYLATED A1C: CPT

## 2019-06-22 PROCEDURE — 99214 OFFICE O/P EST MOD 30 MIN: CPT | Performed by: INTERNAL MEDICINE

## 2019-06-22 RX ORDER — METFORMIN HYDROCHLORIDE 500 MG/1
500 TABLET, EXTENDED RELEASE ORAL 2 TIMES DAILY WITH MEALS
Qty: 180 TABLET | Refills: 3 | Status: SHIPPED | OUTPATIENT
Start: 2019-06-22 | End: 2019-11-29

## 2019-06-22 NOTE — PROGRESS NOTES
HPI:    Patient ID: Miky Quach. is a 34year old male. Chief Complaint: Diabetes (fup) and Hypertension (fup pt stts he was running late and forgot to take his meds this morning. )    Last OV 4/27/19  DM type 2: metformin ER 500mg BID.  A1C 9.8 o This is a chronic problem. The current episode started more than 1 year ago. The problem is unchanged. Condition status: at home 132/85s and at chiro; didn't take medications this AM due to being late and is in pain due to back.  Pertinent negatives include Gastrointestinal: Negative for heartburn, nausea, vomiting, abdominal pain, diarrhea, blood in stool and rectal pain. Endocrine: Negative for polydipsia, polyphagia and polyuria. Musculoskeletal: Positive for back pain.    Neurological: Negative for diz Vitals reviewed. Constitutional: He is oriented to person, place, and time and obese. He appears well-developed. No distress. HENT:   Head: Normocephalic and atraumatic. Eyes: Conjunctivae are normal. Right eye exhibits no discharge.  Left eye exhibit –Blood pressure uncontrolled in office but he states he forgot to take his losartan 100 mg and Norvasc 10 mg this morning because he was running late.   He is tolerating his medications without any side effects and states his blood pressures typically less It’s important for adults to spend less time sitting and being inactive. This is especially true if you have type 2 diabetes. When you are sitting for long periods of time, get up for short sessions of light activity every 30 minutes.   You should aim for a · Exercise with a friend or carry a cell phone if you have one. · Carry or wear identification, such as a necklace or bracelet, that says you have diabetes. · Use the proper footwear and safety equipment for your activity.   · Drink water before, during, If daily activity is new to you, start slow and steady. Begin with 10 minutes of activity each day. Then work up to at least 150 minutes a week of physical activity. Don't let more than 2 days go by without being active.  When sitting for long periods of ti Being active may cause blood sugar to drop faster than usual. This is especially true if you take medicine to manage your blood sugar. But there are things you can do to help reduce the risk of accidental lows.  Keep these tips in mind:  · Always carry iden Brand Name: Cali Siegel  What is this medicine? EMPAGLIFLOZIN (EM pa gli FLOE zin) helps to treat type 2 diabetes. It helps to control blood sugar.  This drug may also reduce the risk of heart attack or stroke if you have type 2 diabetes and risk factors for Side effects that usually do not require medical attention (report to your doctor or health care professional if they continue or are bothersome):  · mild increase in urination  · thirsty  What may interact with this medicine?   Do not take this medicine wi This medicine can cause a serious condition in which there is too much acid in the blood. If you develop nausea, vomiting, stomach pain, unusual tiredness, or breathing problems, stop taking this medicine and call your doctor right away.  If possible, use a

## 2019-06-22 NOTE — PATIENT INSTRUCTIONS
Diabetes: Activity Tips    Being more active can help you manage your diabetes. The tips on this sheet can help you get the most from your exercise. They can also help you stay safe.   Staying Active  It’s important for adults to spend less time sitting a You may be told to plan your exercise for 1 to 2 hours after a meal. In most cases, you don’t need to eat while being active. If you take insulin or medicine that can cause low blood sugar, test your blood sugar before exercising.  And carry a fast-acting s Being physically active every day can help you manage your blood sugar. That’s because an active lifestyle can improve your body’s ability to use insulin. Daily activity can also help delay or prevent complications of diabetes.  And it’s a great way to reli Resistance exercise (also called strength training), makes muscles stronger. It also helps muscles use insulin better. Ask your healthcare provider whether this type of exercise is right for you.  If it is, your healthcare provider can help you work it in t Date Last Reviewed: 6/1/2016  © 1610-7571 [employer]. [address] [facility] [place], [address] [place]. All rights reserved. This information is not intended as a substitute for professional medical care.  Always follow your healthcare professional' · signs and symptoms of a urinary tract infection, such as fever, chills, a burning feeling when urinating, blood in the urine, back pain  · trouble passing urine or change in the amount of urine, including an urgent need to urinate more often, in larger a · pregnant or trying to get pregnant  · breast-feeding  What should I watch for while using this medicine? Visit your doctor or health care professional for regular checks on your progress.   This medicine can cause a serious condition in which there is to

## 2019-07-16 NOTE — TELEPHONE ENCOUNTER
Fax received from [facility] pharmacy requesting refill of Epipen 0.3mg/0.3mL. Refill to be sent to [facility] on [address] in [place], [place].     Last seen: ANOOP GERMAN MD 3/27/2018  Follow up: 9/2018. Pt has not been seen. No pending appt.       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 Pt would like to  tomorrow in [address] [place] [address] office. Please call when note is ready to be picked up.

## 2019-09-20 ENCOUNTER — LAB ENCOUNTER (OUTPATIENT)
Dept: LAB | Age: 34
End: 2019-09-20
Attending: INTERNAL MEDICINE
Payer: COMMERCIAL

## 2019-09-20 ENCOUNTER — OFFICE VISIT (OUTPATIENT)
Dept: INTERNAL MEDICINE CLINIC | Facility: CLINIC | Age: 34
End: 2019-09-20
Payer: COMMERCIAL

## 2019-09-20 ENCOUNTER — PATIENT MESSAGE (OUTPATIENT)
Dept: INTERNAL MEDICINE CLINIC | Facility: CLINIC | Age: 34
End: 2019-09-20

## 2019-09-20 ENCOUNTER — OFFICE VISIT (OUTPATIENT)
Dept: PULMONOLOGY | Facility: CLINIC | Age: 34
End: 2019-09-20
Payer: COMMERCIAL

## 2019-09-20 ENCOUNTER — APPOINTMENT (OUTPATIENT)
Dept: LAB | Age: 34
End: 2019-09-20
Attending: INTERNAL MEDICINE
Payer: COMMERCIAL

## 2019-09-20 VITALS
BODY MASS INDEX: 40.43 KG/M2 | WEIGHT: 315 LBS | HEIGHT: 74 IN | SYSTOLIC BLOOD PRESSURE: 157 MMHG | HEART RATE: 92 BPM | DIASTOLIC BLOOD PRESSURE: 91 MMHG | TEMPERATURE: 99 F

## 2019-09-20 VITALS
WEIGHT: 315 LBS | HEIGHT: 74 IN | DIASTOLIC BLOOD PRESSURE: 101 MMHG | SYSTOLIC BLOOD PRESSURE: 163 MMHG | BODY MASS INDEX: 40.43 KG/M2 | HEART RATE: 86 BPM | OXYGEN SATURATION: 98 %

## 2019-09-20 DIAGNOSIS — G47.33 OSA (OBSTRUCTIVE SLEEP APNEA): Primary | ICD-10-CM

## 2019-09-20 DIAGNOSIS — E11.9 TYPE 2 DIABETES MELLITUS WITHOUT COMPLICATION, WITHOUT LONG-TERM CURRENT USE OF INSULIN (HCC): ICD-10-CM

## 2019-09-20 DIAGNOSIS — Z79.4 TYPE 2 DIABETES MELLITUS WITHOUT COMPLICATION, WITH LONG-TERM CURRENT USE OF INSULIN (HCC): Primary | ICD-10-CM

## 2019-09-20 DIAGNOSIS — I15.2 HYPERTENSION ASSOCIATED WITH DIABETES (HCC): ICD-10-CM

## 2019-09-20 DIAGNOSIS — E11.9 TYPE 2 DIABETES MELLITUS WITHOUT COMPLICATION, WITHOUT LONG-TERM CURRENT USE OF INSULIN (HCC): Primary | ICD-10-CM

## 2019-09-20 DIAGNOSIS — Z23 NEED FOR 23-POLYVALENT PNEUMOCOCCAL POLYSACCHARIDE VACCINE: ICD-10-CM

## 2019-09-20 DIAGNOSIS — R35.0 URINARY FREQUENCY: ICD-10-CM

## 2019-09-20 DIAGNOSIS — Z23 NEED FOR INFLUENZA VACCINATION: ICD-10-CM

## 2019-09-20 DIAGNOSIS — N20.0 RECURRENT KIDNEY STONES: ICD-10-CM

## 2019-09-20 DIAGNOSIS — E11.59 HYPERTENSION ASSOCIATED WITH DIABETES (HCC): ICD-10-CM

## 2019-09-20 DIAGNOSIS — E11.9 TYPE 2 DIABETES MELLITUS WITHOUT COMPLICATION, WITH LONG-TERM CURRENT USE OF INSULIN (HCC): Primary | ICD-10-CM

## 2019-09-20 LAB
BILIRUB UR QL: NEGATIVE
CLARITY UR: CLEAR
COLOR UR: YELLOW
EST. AVERAGE GLUCOSE BLD GHB EST-MCNC: 220 MG/DL (ref 68–126)
GLUCOSE UR-MCNC: >=500 MG/DL
HBA1C MFR BLD HPLC: 9.3 % (ref ?–5.7)
HGB UR QL STRIP.AUTO: NEGATIVE
KETONES UR-MCNC: NEGATIVE MG/DL
LEUKOCYTE ESTERASE UR QL STRIP.AUTO: NEGATIVE
NITRITE UR QL STRIP.AUTO: NEGATIVE
PH UR: 6 [PH] (ref 5–8)
PROT UR-MCNC: NEGATIVE MG/DL
SP GR UR STRIP: 1.03 (ref 1–1.03)
UROBILINOGEN UR STRIP-ACNC: <2

## 2019-09-20 PROCEDURE — 93005 ELECTROCARDIOGRAM TRACING: CPT

## 2019-09-20 PROCEDURE — 83036 HEMOGLOBIN GLYCOSYLATED A1C: CPT

## 2019-09-20 PROCEDURE — 81003 URINALYSIS AUTO W/O SCOPE: CPT | Performed by: INTERNAL MEDICINE

## 2019-09-20 PROCEDURE — 36415 COLL VENOUS BLD VENIPUNCTURE: CPT

## 2019-09-20 PROCEDURE — 93010 ELECTROCARDIOGRAM REPORT: CPT | Performed by: INTERNAL MEDICINE

## 2019-09-20 PROCEDURE — 99213 OFFICE O/P EST LOW 20 MIN: CPT | Performed by: INTERNAL MEDICINE

## 2019-09-20 PROCEDURE — 99214 OFFICE O/P EST MOD 30 MIN: CPT | Performed by: INTERNAL MEDICINE

## 2019-09-20 RX ORDER — ROSUVASTATIN CALCIUM 10 MG/1
10 TABLET, COATED ORAL NIGHTLY
Qty: 90 TABLET | Refills: 3 | Status: SHIPPED | OUTPATIENT
Start: 2019-09-20 | End: 2019-12-19 | Stop reason: WASHOUT

## 2019-09-20 RX ORDER — HYDROCHLOROTHIAZIDE 12.5 MG/1
12.5 TABLET ORAL DAILY
Qty: 90 TABLET | Refills: 3 | Status: SHIPPED | OUTPATIENT
Start: 2019-09-20 | End: 2019-12-21

## 2019-09-20 NOTE — PROGRESS NOTES
Referring Physician  C. Auther Boast, MD    History of Present Illness  Patient seen today for follow-up visit at pulmonary clinic. Patient has been using his CPAP device with significant improvement in hypersomnia and fatigue.   He is tolerating his device [address]  11/27/2018  12:14 PM

## 2019-09-20 NOTE — PROGRESS NOTES
HPI:    Patient ID: Nia Haas. is a 34year old male. Chief Complaint: Diabetes (fup); Hypertension (fup ); and Other (Needs EKG, And letter for  DOT license. )      Diabetes   He presents for his follow-up diabetic visit.  He has type 2 diabete This is a new problem. The current episode started 1 to 4 weeks ago. The problem occurs daily. The problem has been waxing and waning. Associated symptoms include urinary symptoms (frequency).  Pertinent negatives include no abdominal pain, chest pain, coug Neck: Neck supple. Cardiovascular: Normal rate and regular rhythm. No murmur heard. Edema not present. Pulmonary/Chest: Effort normal and breath sounds normal. No respiratory distress. Abdominal: Soft.  Bowel sounds are normal. He exhibits no dist Diabetes mellitus type 2, noninsulin dependent ([facility] [address]         Date Noted: 06/22/2019      Myalgia         Date Noted: 08/04/2017      Fatigue         Date Noted: 08/04/2017      Anxiety         Date Noted: 08/04/2017      Dyslipidemia         Date Noted: - hydrochlorothiazide 12.5 MG Oral Tab; Take 1 tablet (12.5 mg total) by mouth daily. Dispense: 90 tablet; Refill: 3  - Rosuvastatin Calcium 10 MG Oral Tab; Take 1 tablet (10 mg total) by mouth nightly. Dispense: 90 tablet;  Refill: 3  - EKG 12-LEAD  - PN 6. Need for 23-polyvalent pneumococcal polysaccharide vaccine  - PNEUMOCOCCAL IMM (PNEUMOVAX)  Plan  Patient wants to return next week    Letter stating he is under my care given. 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  He will return for vac Brian Yanez MD  Internal Medicine      ----------------------------------------- END NOTE-----------------------------------------     Patient Instructions   1.  Blood pressure  - continue amlodipine 10mg daily  - continue losartan 100mg daily   - start h Brisk activity gets your heart beating faster. This can help make you more fit, lose extra weight, and manage your blood sugar level. Try brisk walking. Or try swimming or bike riding if you have foot or leg problems.  You can break up your exercise into ch · Test your blood sugar before and after you exercise if you are told to do so.  Have a small carbohydrate snack if your blood sugar is low before you start exercising.   When to stop exercising and call your healthcare provider  Stop exercising and call yo Talk with your healthcare provider before starting a diabetes diet or weight loss program. If you haven't talked with a dietitian yet, ask your provider for a referral. The following guidelines can help you succeed:  · Select foods from the 6 food groups b ? The American Diabetes Association [phone] www. diabetes. org  Date Last Reviewed: 8/1/2016  © 2329-3236 [employer]. 1407 [facility] [place], [address] [place] [address]. All rights reserved.  This information is not intended as a substitute for pro · heartburn  · nausea  · stomach gas, pain, upset  What may interact with this medicine?   Do not take this medicine with any of the following medications:  · herbal medicines like red yeast rice  This medicine may also interact with the following medicatio Do not become pregnant while taking this medicine. Women should inform their health care professional if they wish to become pregnant or think they might be pregnant. There is a potential for serious side effects to an unborn child.  Talk to your health car

## 2019-09-20 NOTE — PATIENT INSTRUCTIONS
1. Blood pressure  - continue amlodipine 10mg daily  - continue losartan 100mg daily   - start hydrochlorothiazide 12.5mg daily   - blood pressure goal always less than 140/90; or as close to 120/80 as we can  - cut out all salt and try to eat healthier soreness. Do a mild version of your activity for 5 minutes before and after your routine. You can also learn stretches that will help keep your muscles loose. Your healthcare provider may show you good ways to warm up and stretch.   Do the talk-sing test  T lightheadedness  · Unusually rapid or slow pulse  · Increased joint or muscle pain  · Nausea or vomiting  Date Last Reviewed: 11/1/2018  © 3200-4065 [employer] [address]. 1407 [facility] [place], [address] [place] [address]. All rights reserved.  This information directed by your provider. Take any medicine as prescribed by your provider. · Learn to read food labels and pick the right portion sizes. · Eat only the amount of food in your meal plan. Eat about the same amount of food at regular times each day.  Don’t problems in patients with risk factors for heart disease. Diet and lifestyle changes are often used with this drug. How should I use this medicine? Take this medicine by mouth with a glass of water. Follow the directions on the prescription label.  Do not time. Do not take double or extra doses. Where should I keep my medicine? Keep out of the reach of children. Store at room temperature between 20 and 25 degrees C (68 and 77 degrees F). Keep container tightly closed (protect from moisture).  Throw away a and smoking, and keep a proper exercise schedule. This medicine may cause a decrease in Co-Enzyme Q-10. You should make sure that you get enough Co-Enzyme Q-10 while you are taking this medicine.  Discuss the foods you eat and the vitamins you take with yo

## 2019-09-21 NOTE — TELEPHONE ENCOUNTER
Called pt and informed of results. Printed EKG AND TRACING results as requested for his DOT px. Pt will come to office and pick it up.

## 2019-11-29 DIAGNOSIS — E11.9 DIABETES MELLITUS TYPE 2, NONINSULIN DEPENDENT (HCC): ICD-10-CM

## 2019-11-29 RX ORDER — METFORMIN HYDROCHLORIDE 1000 MG/1
1000 TABLET, FILM COATED, EXTENDED RELEASE ORAL 2 TIMES DAILY WITH MEALS
Qty: 60 TABLET | Refills: 0 | Status: SHIPPED | OUTPATIENT
Start: 2019-11-29 | End: 2019-12-21

## 2019-11-29 RX ORDER — METFORMIN HYDROCHLORIDE 500 MG/1
500 TABLET, EXTENDED RELEASE ORAL 2 TIMES DAILY WITH MEALS
Qty: 180 TABLET | Refills: 3 | OUTPATIENT
Start: 2019-11-29 | End: 2020-02-27

## 2019-11-29 NOTE — TELEPHONE ENCOUNTER
Pharmacy is requesting a refill on the following medication:   drug: METFORMIN HCl ER Oral Tablet Extended Release 24 Hour 500 MG  SIG: TAKE ONE TABLET BY MOUTH ONE TIME DAILY  Qty: 30  Refiills: 2

## 2019-11-29 NOTE — TELEPHONE ENCOUNTER
Spoke to patient, he states he is now taking Metformin 2000 mg, which is 2 tablets in the morning and evening (4 total) a day, as he was advised to increase his dose at his last office visit on 09/20/19. Please see provider's note below.     Patient only ha

## 2019-11-29 NOTE — TELEPHONE ENCOUNTER
Notes from office visit with Dr. Carina Murray on 9/20/19    2. Diabetes  - get your A1C check today  - increase your metformin from 1000mg to 1500mg, ie 3 tablets in the morning.  After 1 week if you are doing well, increase to 2000mg, ie 4 tablets in the morning

## 2019-11-29 NOTE — TELEPHONE ENCOUNTER
Patient called upset that he needs refill for Metformin with the new dose of 1500 mg. Patient states the order was never called in. I do not see an order ;but, I do see it in doctor's note that he want to increase med to 1500 mg.  Patient states he is anabell

## 2019-11-30 NOTE — TELEPHONE ENCOUNTER
Reviewed chart -\"-Tolerating metformin thousand milligrams daily, will increase to 1500 mg daily for 1 week then to 2000 mg maximum dose. Add second agent if worsened. Will have him see ophthalmology. Advised him to see dietary.  Encouraged to see endocr

## 2019-11-30 NOTE — TELEPHONE ENCOUNTER
Spoke with patient (verified name and ), verify metformin dose-states that he is taking 2 tablets in the morning and 2 tablets at night per Dr Katheryn Torres instruction from his [address] [place] [address].  Clarified with patient the dose of the tablet-states that he is taking metfo

## 2019-12-01 NOTE — TELEPHONE ENCOUNTER
Thanks for sending that in. I have also noticed when PCP is listed as Dr. Yamilex Joe, pharmacy requests are not coming to my inbox until the pt calls us directly. Have seen this a few times and pt's are unfortunately upset.   Please inform future patient's that a

## 2019-12-21 ENCOUNTER — OFFICE VISIT (OUTPATIENT)
Dept: INTERNAL MEDICINE CLINIC | Facility: CLINIC | Age: 34
End: 2019-12-21
Payer: COMMERCIAL

## 2019-12-21 ENCOUNTER — APPOINTMENT (OUTPATIENT)
Dept: LAB | Age: 34
End: 2019-12-21
Attending: INTERNAL MEDICINE
Payer: COMMERCIAL

## 2019-12-21 VITALS
HEIGHT: 74 IN | WEIGHT: 315 LBS | HEART RATE: 94 BPM | DIASTOLIC BLOOD PRESSURE: 81 MMHG | BODY MASS INDEX: 40.43 KG/M2 | SYSTOLIC BLOOD PRESSURE: 150 MMHG | TEMPERATURE: 99 F

## 2019-12-21 DIAGNOSIS — E78.5 DYSLIPIDEMIA: ICD-10-CM

## 2019-12-21 DIAGNOSIS — E11.9 TYPE 2 DIABETES MELLITUS WITHOUT COMPLICATION, WITHOUT LONG-TERM CURRENT USE OF INSULIN (HCC): ICD-10-CM

## 2019-12-21 DIAGNOSIS — E11.9 TYPE 2 DIABETES MELLITUS WITHOUT COMPLICATION, WITHOUT LONG-TERM CURRENT USE OF INSULIN (HCC): Primary | ICD-10-CM

## 2019-12-21 DIAGNOSIS — I10 HYPERTENSION GOAL BP (BLOOD PRESSURE) < 130/80: ICD-10-CM

## 2019-12-21 PROCEDURE — 99214 OFFICE O/P EST MOD 30 MIN: CPT | Performed by: INTERNAL MEDICINE

## 2019-12-21 PROCEDURE — 36415 COLL VENOUS BLD VENIPUNCTURE: CPT

## 2019-12-21 PROCEDURE — 83036 HEMOGLOBIN GLYCOSYLATED A1C: CPT

## 2019-12-21 RX ORDER — METFORMIN HYDROCHLORIDE 500 MG/1
2000 TABLET, EXTENDED RELEASE ORAL
Qty: 360 TABLET | Refills: 3 | Status: SHIPPED | OUTPATIENT
Start: 2019-12-21 | End: 2020-02-01

## 2019-12-21 RX ORDER — LOSARTAN POTASSIUM 100 MG/1
100 TABLET ORAL DAILY
Qty: 90 TABLET | Refills: 1 | Status: SHIPPED | OUTPATIENT
Start: 2019-12-21 | End: 2020-06-24

## 2019-12-21 RX ORDER — ROSUVASTATIN CALCIUM 5 MG/1
5 TABLET, COATED ORAL NIGHTLY
Qty: 90 TABLET | Refills: 3 | Status: SHIPPED | OUTPATIENT
Start: 2019-12-21 | End: 2020-12-05

## 2019-12-21 RX ORDER — METFORMIN HYDROCHLORIDE 500 MG/1
1000 TABLET, EXTENDED RELEASE ORAL 2 TIMES DAILY
Qty: 360 TABLET | Refills: 3 | Status: SHIPPED | OUTPATIENT
Start: 2019-12-21 | End: 2019-12-21

## 2019-12-21 RX ORDER — METFORMIN HYDROCHLORIDE 500 MG/1
2 TABLET, EXTENDED RELEASE ORAL 2 TIMES DAILY
COMMUNITY
Start: 2019-12-02 | End: 2019-12-21

## 2019-12-21 RX ORDER — AMLODIPINE BESYLATE 10 MG/1
10 TABLET ORAL DAILY
Qty: 90 TABLET | Refills: 3 | Status: SHIPPED | OUTPATIENT
Start: 2019-12-21 | End: 2020-12-05

## 2019-12-21 RX ORDER — ROSUVASTATIN CALCIUM 10 MG/1
1 TABLET, COATED ORAL NIGHTLY
COMMUNITY
Start: 2019-09-20 | End: 2019-12-21

## 2019-12-21 RX ORDER — HYDROCHLOROTHIAZIDE 25 MG/1
25 TABLET ORAL DAILY
Qty: 90 TABLET | Refills: 1 | Status: SHIPPED | OUTPATIENT
Start: 2019-12-21 | End: 2020-06-24

## 2019-12-21 NOTE — PROGRESS NOTES
History of Present Illness   Patient ID: Candelaria Romero. is a 30year old male. Chief Complaint: Diabetes (check up, )      Diabetes   He presents for his follow-up diabetic visit. He has type 2 diabetes mellitus.  Pertinent negatives for hypoglycemi This is a chronic problem. The current episode started 1 to 4 weeks ago. The problem has been gradually improving since onset. Pertinent negatives include no blurred vision, chest pain, headaches or sweats.  (Lasha Hurst had a new baby) There are no associated ag Psychiatric: He has a normal mood and affect. Labs & Imaging  Pertinent labs and imaging reviewed.    Lab Results   Component Value Date     (H) 06/22/2019    BUN 14 06/22/2019    BUNCREA 16.3 06/22/2019    CREATSERUM 0.86 06/22/2019    ANION • Hypertension Father    • Diabetes Father    • Other (Kidney Stones) Father    • Breast Cancer Mother    • Hypertension Mother    • Diabetes Maternal Grandmother    • Prostate Cancer Maternal Grandfather        Reviewed:  History reviewed.  No pertinent buchanan Plan  Chronic problem. Uncontrolled A1c 9.3. Tolerating metformin 1000 mg but has been taking twice daily instead of all at the same time. Instructions changed. Will start Jardiance as above, side effects medication discussed. Repeat A1c today.   See p Sig: Take 1 tablet (25 mg total) by mouth daily.    • metFORMIN HCl  MG Oral Tablet 24 Hr 360 tablet 3     Sig: Take 4 tablets (2,000 mg total) by mouth daily with breakfast.       Patient asked to sign release of information for outside records if · muscle weakness  · nausea, vomiting, unusual stomach upset or pain  · penile discharge, itching, or pain in men  · signs and symptoms of a genital infection, such as fever; tenderness, redness, or swelling in the genitals or area from the genitals to the · having surgery  · high cholesterol  · high levels of potassium in the blood  · history of pancreatitis or pancreas problems  · history of yeast infection of the penis or vagina  · if you often drink alcohol  · infections in the bladder, kidneys, or urina Do not skip meals. Ask your doctor or health care professional if you should avoid alcohol. Many nonprescription cough and cold products contain sugar or alcohol. These can affect blood sugar.   Wear a medical ID bracelet or chain, and carry a card that mustapha

## 2019-12-21 NOTE — PATIENT INSTRUCTIONS
Empagliflozin oral tablets  Brand Name: Cynthia Bellamy  What is this medicine? EMPAGLIFLOZIN (EM pa gli FLOE zin) helps to treat type 2 diabetes. It helps to control blood sugar.  This drug may also reduce the risk of heart attack or stroke if you have type 2 Side effects that usually do not require medical attention (report to your doctor or health care professional if they continue or are bothersome):  · mild increase in urination  · thirsty  What may interact with this medicine?   Do not take this medicine wi This medicine can cause a serious condition in which there is too much acid in the blood. If you develop nausea, vomiting, stomach pain, unusual tiredness, or breathing problems, stop taking this medicine and call your doctor right away.  If possible, use a

## 2019-12-27 DIAGNOSIS — E11.9 TYPE 2 DIABETES MELLITUS WITHOUT COMPLICATION, WITHOUT LONG-TERM CURRENT USE OF INSULIN (HCC): Primary | ICD-10-CM

## 2020-02-01 ENCOUNTER — OFFICE VISIT (OUTPATIENT)
Dept: FAMILY MEDICINE CLINIC | Facility: CLINIC | Age: 35
End: 2020-02-01
Payer: COMMERCIAL

## 2020-02-01 VITALS
SYSTOLIC BLOOD PRESSURE: 140 MMHG | WEIGHT: 315 LBS | HEIGHT: 74 IN | TEMPERATURE: 98 F | RESPIRATION RATE: 19 BRPM | DIASTOLIC BLOOD PRESSURE: 89 MMHG | BODY MASS INDEX: 40.43 KG/M2 | HEART RATE: 87 BPM

## 2020-02-01 DIAGNOSIS — E11.9 DIABETES MELLITUS WITHOUT COMPLICATION (HCC): Primary | ICD-10-CM

## 2020-02-01 PROCEDURE — 99213 OFFICE O/P EST LOW 20 MIN: CPT | Performed by: PHYSICIAN ASSISTANT

## 2020-02-01 RX ORDER — CETIRIZINE HYDROCHLORIDE 10 MG/1
10 TABLET ORAL DAILY
Qty: 90 TABLET | Refills: 3 | Status: SHIPPED | OUTPATIENT
Start: 2020-02-01 | End: 2020-03-19 | Stop reason: ALTCHOICE

## 2020-02-01 RX ORDER — FLUTICASONE PROPIONATE 50 MCG
2 SPRAY, SUSPENSION (ML) NASAL DAILY
Qty: 1 BOTTLE | Refills: 11 | Status: SHIPPED | OUTPATIENT
Start: 2020-02-01 | End: 2020-03-02

## 2020-02-01 NOTE — PROGRESS NOTES
HPI:   HPI  30year-old male is here in the office complaining of diarrhea since the past year. Patient states that the diarrhea starts after he took Metformin. Patient was diagnosed with type 2 DM since last year.  Patient has not monitored BG daily and ha Onset   • Hypertension Father    • Diabetes Father    • Other (Kidney Stones) Father    • Breast Cancer Mother    • Hypertension Mother    • Diabetes Maternal Grandmother    • Prostate Cancer Maternal Grandfather        Social History:   Social History rhinorrhea, sinus pressure and sore throat. Respiratory: Negative for cough, chest tightness, shortness of breath and wheezing. Cardiovascular: Negative for chest pain and palpitations. Gastrointestinal: Positive for diarrhea.  Negative for nausea, SITagliptin Phosphate (JANUVIA) 100 MG Oral Tab    Other Relevant Orders    DME DIABETIC TEST STRIPS AND SUPPLIES      Discussed lab results with patient.  Discontinue Metformin 1000 mg PO BID and Jardiance 10 mg PO QAM. Start Jardiance 25 mg PO QAM and Humphrey

## 2020-02-28 ENCOUNTER — TELEPHONE (OUTPATIENT)
Dept: PULMONOLOGY | Facility: CLINIC | Age: 35
End: 2020-02-28

## 2020-02-28 ENCOUNTER — OFFICE VISIT (OUTPATIENT)
Dept: PODIATRY CLINIC | Facility: CLINIC | Age: 35
End: 2020-02-28
Payer: COMMERCIAL

## 2020-02-28 ENCOUNTER — OFFICE VISIT (OUTPATIENT)
Dept: PULMONOLOGY | Facility: CLINIC | Age: 35
End: 2020-02-28
Payer: COMMERCIAL

## 2020-02-28 VITALS
SYSTOLIC BLOOD PRESSURE: 140 MMHG | RESPIRATION RATE: 18 BRPM | OXYGEN SATURATION: 96 % | WEIGHT: 315 LBS | HEART RATE: 82 BPM | DIASTOLIC BLOOD PRESSURE: 80 MMHG | BODY MASS INDEX: 40.43 KG/M2 | HEIGHT: 74 IN

## 2020-02-28 DIAGNOSIS — E11.9 DIABETES MELLITUS TYPE 2, NONINSULIN DEPENDENT (HCC): Primary | ICD-10-CM

## 2020-02-28 DIAGNOSIS — G47.33 OSA (OBSTRUCTIVE SLEEP APNEA): Primary | ICD-10-CM

## 2020-02-28 PROCEDURE — 99213 OFFICE O/P EST LOW 20 MIN: CPT | Performed by: INTERNAL MEDICINE

## 2020-02-28 PROCEDURE — 99243 OFF/OP CNSLTJ NEW/EST LOW 30: CPT | Performed by: PODIATRIST

## 2020-02-28 NOTE — PROGRESS NOTES
HPI:    Patient ID: Ilya Coyne. is a 30year old male. This pleasant 60-year-old male presents as a new patient to me on consult from [address]. Patient states that he is here for a diabetic foot evaluation.   He has been a diabetic for approxima evidence of weakness. At present I see no concerns in reference to diabetes. I cautioned this patient in reference to daily inspection, proper hygiene, and a consistent and daily use of a lotion.   He is well-informed and indicates an understanding I plan

## 2020-02-28 NOTE — PROGRESS NOTES
Referring Physician  Kim Velázquez MD    History of Present Illness  Patient seen today for follow-up visit at pulmonary clinic. Patient has been using his CPAP device with significant improvement in hypersomnia and fatigue.   He is tolerating his device f apnea  2. Obesity    Plan  -Patient seen today for management of underlying obstructive sleep apnea. I am glad to hear he is benefiting from his CPAP device.   Review download data over the last 30 days with 100% usage and average usage of 6 hours and 43

## 2020-02-28 NOTE — TELEPHONE ENCOUNTER
DME order placed for Adjust CPAP pressure setting to maximal pressure setting of 15 CWP. Minimal pressure settings may remain same as before. Order and LOV faxed to [facility] f: [phone], confirmation received.

## 2020-03-08 ENCOUNTER — PATIENT MESSAGE (OUTPATIENT)
Dept: PODIATRY CLINIC | Facility: CLINIC | Age: 35
End: 2020-03-08

## 2020-03-08 ENCOUNTER — PATIENT MESSAGE (OUTPATIENT)
Dept: PULMONOLOGY | Facility: CLINIC | Age: 35
End: 2020-03-08

## 2020-03-08 ENCOUNTER — PATIENT MESSAGE (OUTPATIENT)
Dept: FAMILY MEDICINE CLINIC | Facility: CLINIC | Age: 35
End: 2020-03-08

## 2020-03-09 NOTE — TELEPHONE ENCOUNTER
S/w pt. Needs a note from Dr Chen Hair with physical exam findings. Pt wanted the note through [employer]. Sent via a communication/letter. I asked pt to contact us if anything else is needed.

## 2020-03-09 NOTE — TELEPHONE ENCOUNTER
From: Aviva Mart. To: Hemant Rabago DPM  Sent: 3/8/2020 9:03 PM CDT  Subject: Visit Follow-up Question    I was in to see you and I had asked about a note for the physician when I get my d.o.t.  Physical. I am going to do so on this comin

## 2020-03-10 ENCOUNTER — TELEPHONE (OUTPATIENT)
Dept: FAMILY MEDICINE CLINIC | Facility: CLINIC | Age: 35
End: 2020-03-10

## 2020-03-10 DIAGNOSIS — E11.9 TYPE 2 DIABETES MELLITUS WITHOUT COMPLICATION, WITHOUT LONG-TERM CURRENT USE OF INSULIN (HCC): Primary | ICD-10-CM

## 2020-03-10 NOTE — TELEPHONE ENCOUNTER
Patient returned call, patient indicates the letter should include that he is keeping up with his cpap usage and seeing the doctor twice a year as he is suppose to. Same letter they completed last year,thanks.

## 2020-03-10 NOTE — TELEPHONE ENCOUNTER
From: Barrera Rod. To: Aleksander Singh DO  Sent: 3/8/2020 9:07 PM CDT  Subject: Visit Follow-up Question    Hi doc, I was in a couple weeks ago and I need to have a note for my d.o.t. Physical, this coming Saturday I will go to get it done.  If I

## 2020-03-10 NOTE — TELEPHONE ENCOUNTER
Jazmyn Brown, Alexander Solares PA-C 2 days ago        Hello doc, I'm going for my d.o.t. Physical on Saturday and I will need a note just saying that you are treating me and I am doing well with all of my medications.  If you could please get on

## 2020-03-10 NOTE — TELEPHONE ENCOUNTER
Patient following up, requesting a note for D. O. T, will need note before doing his physical on Sat, see below for details of note. Requesting note be sent via [employer].    Patient also requesting a return call from [address] [facility]    *Patient requesting order to

## 2020-03-13 NOTE — TELEPHONE ENCOUNTER
Spoke with pt who stated he needs letter release to his Mychart. Letter pending for  to review [facility]

## 2020-03-13 NOTE — TELEPHONE ENCOUNTER
Pt called again about note. Pt needs this note by tomorrow morning because he is going for DOT physical.  Please call.

## 2020-03-13 NOTE — TELEPHONE ENCOUNTER
Verbal order from [facility] to release letter via [address] [place] [address] [address]. Followed-up with patient who states he did receive letter.

## 2020-03-14 ENCOUNTER — TELEPHONE (OUTPATIENT)
Dept: OTHER | Age: 35
End: 2020-03-14

## 2020-03-14 NOTE — TELEPHONE ENCOUNTER
Patient states he is bringing a DOT from work to be filled out at Thursday appointment. Advised patient he can also drop the form off at the doctor's office as well. Patient verbalized understanding.

## 2020-03-19 ENCOUNTER — OFFICE VISIT (OUTPATIENT)
Dept: FAMILY MEDICINE CLINIC | Facility: CLINIC | Age: 35
End: 2020-03-19
Payer: COMMERCIAL

## 2020-03-19 VITALS
WEIGHT: 315 LBS | BODY MASS INDEX: 40.43 KG/M2 | SYSTOLIC BLOOD PRESSURE: 152 MMHG | HEIGHT: 74 IN | RESPIRATION RATE: 17 BRPM | HEART RATE: 76 BPM | DIASTOLIC BLOOD PRESSURE: 90 MMHG

## 2020-03-19 DIAGNOSIS — I10 ESSENTIAL HYPERTENSION: ICD-10-CM

## 2020-03-19 DIAGNOSIS — E11.9 TYPE 2 DIABETES MELLITUS WITHOUT COMPLICATION, WITHOUT LONG-TERM CURRENT USE OF INSULIN (HCC): Primary | ICD-10-CM

## 2020-03-19 DIAGNOSIS — E78.2 MIXED HYPERLIPIDEMIA: ICD-10-CM

## 2020-03-19 PROBLEM — M79.10 MYALGIA: Status: RESOLVED | Noted: 2017-08-04 | Resolved: 2020-03-19

## 2020-03-19 PROBLEM — R53.83 FATIGUE: Status: RESOLVED | Noted: 2017-08-04 | Resolved: 2020-03-19

## 2020-03-19 PROBLEM — F41.9 ANXIETY: Status: RESOLVED | Noted: 2017-08-04 | Resolved: 2020-03-19

## 2020-03-19 PROBLEM — E78.5 DYSLIPIDEMIA: Status: RESOLVED | Noted: 2017-04-28 | Resolved: 2020-03-19

## 2020-03-19 PROCEDURE — 99213 OFFICE O/P EST LOW 20 MIN: CPT | Performed by: PHYSICIAN ASSISTANT

## 2020-03-19 RX ORDER — BLOOD-GLUCOSE METER
EACH MISCELLANEOUS
COMMUNITY
Start: 2020-02-01

## 2020-03-19 RX ORDER — LANCETS 30 GAUGE
EACH MISCELLANEOUS
COMMUNITY
Start: 2020-02-01

## 2020-03-19 NOTE — PROGRESS NOTES
HPI:   Diabetes   He presents for his follow-up diabetic visit. He has type 2 diabetes mellitus. His disease course has been improving. Pertinent negatives for hypoglycemia include no dizziness or headaches. Associated symptoms include weight loss.  Pertine Relation Age of Onset   • Hypertension Father    • Diabetes Father    • Other (Kidney Stones) Father    • Breast Cancer Mother    • Hypertension Mother    • Diabetes Maternal Grandmother    • Prostate Cancer Maternal Grandfather        Social History:    So discharge, ear pain, postnasal drip, rhinorrhea, sinus pressure and sore throat. Eyes: Negative for blurred vision. Respiratory: Negative for cough, chest tightness, shortness of breath and wheezing.     Cardiovascular: Negative for chest pain and palp without long-term current use of insulin ([facility] [address] - Primary     Continue with Jardiance 25 mg PO QAM. His HbA1C was 7.8% on 03/14/2020. Recheck HbA1C in 3 months.          Relevant Medications    Empagliflozin (JARDIANCE) 25 MG Oral Tab        Discussed plan o

## 2020-05-05 ENCOUNTER — TELEPHONE (OUTPATIENT)
Dept: INTERNAL MEDICINE CLINIC | Facility: CLINIC | Age: 35
End: 2020-05-05

## 2020-06-22 DIAGNOSIS — E11.9 TYPE 2 DIABETES MELLITUS WITHOUT COMPLICATION, WITHOUT LONG-TERM CURRENT USE OF INSULIN (HCC): ICD-10-CM

## 2020-06-22 DIAGNOSIS — I10 HYPERTENSION GOAL BP (BLOOD PRESSURE) < 130/80: ICD-10-CM

## 2020-06-22 NOTE — TELEPHONE ENCOUNTER
losartan 100 MG Oral Tab, Take 1 tablet (100 mg total) by mouth daily. , Disp: 90 tablet, Rfl: 1  hydrochlorothiazide 25 MG Oral Tab, Take 1 tablet (25 mg total) by mouth daily. , Disp: 90 tablet, Rfl: 1

## 2020-06-23 NOTE — TELEPHONE ENCOUNTER
Called patient offered him appointment due to he don't have BP machine at home and patient would like to go with Raul Kan who is managing his diabetes, patient states that he would rather go to just one doctor to go everything for him.  Any question patie

## 2020-06-24 RX ORDER — LOSARTAN POTASSIUM 100 MG/1
100 TABLET ORAL DAILY
Qty: 90 TABLET | Refills: 1 | Status: SHIPPED | OUTPATIENT
Start: 2020-06-24 | End: 2020-12-05

## 2020-06-24 RX ORDER — HYDROCHLOROTHIAZIDE 25 MG/1
25 TABLET ORAL DAILY
Qty: 90 TABLET | Refills: 1 | Status: SHIPPED | OUTPATIENT
Start: 2020-06-24 | End: 2020-12-05

## 2020-06-24 NOTE — TELEPHONE ENCOUNTER
Min I received a call from pt pharmacy and they were asking about pt medication refill request for HCTZ and the losartan. I informed them of Gentry Priest wanting pt to f/u. They will inform pt. But I noted that Pt wants to f/u with you as his PCP.  DO you want

## 2020-06-24 NOTE — TELEPHONE ENCOUNTER
Pt notified. Requested pcp be changed to  Ki- PCP changed. Pt states did speak to insurance and was informed the switch was ok.     Pt will go have labs done and schedule f/u

## 2020-11-13 ENCOUNTER — TELEPHONE (OUTPATIENT)
Dept: FAMILY MEDICINE CLINIC | Facility: CLINIC | Age: 35
End: 2020-11-13

## 2020-11-13 NOTE — TELEPHONE ENCOUNTER
PT said he received orders on mycharts for nephrology screening.  Pt not sure if if Chilango Riderottoniel needs to do it or a specialist. Please advise

## 2020-11-25 RX ORDER — EMPAGLIFLOZIN 25 MG/1
1 TABLET, FILM COATED ORAL DAILY
Qty: 30 TABLET | Refills: 0 | Status: SHIPPED | OUTPATIENT
Start: 2020-11-25 | End: 2020-12-05

## 2020-11-25 NOTE — TELEPHONE ENCOUNTER
Patient, states that the pharmacy has sent this request twice already. Pt would like a refill on his jardiance medication. Pt will be out of medication in the next day or two.  Pt states that he will be doing his blood work on Friday and does have a appoint

## 2020-11-27 ENCOUNTER — LAB ENCOUNTER (OUTPATIENT)
Dept: LAB | Age: 35
End: 2020-11-27
Attending: PHYSICIAN ASSISTANT
Payer: COMMERCIAL

## 2020-11-27 DIAGNOSIS — E11.9 TYPE 2 DIABETES MELLITUS WITHOUT COMPLICATION, WITHOUT LONG-TERM CURRENT USE OF INSULIN (HCC): ICD-10-CM

## 2020-11-27 PROCEDURE — 36415 COLL VENOUS BLD VENIPUNCTURE: CPT

## 2020-11-27 PROCEDURE — 83036 HEMOGLOBIN GLYCOSYLATED A1C: CPT

## 2020-12-05 ENCOUNTER — OFFICE VISIT (OUTPATIENT)
Dept: FAMILY MEDICINE CLINIC | Facility: CLINIC | Age: 35
End: 2020-12-05
Payer: COMMERCIAL

## 2020-12-05 VITALS
DIASTOLIC BLOOD PRESSURE: 87 MMHG | HEIGHT: 74 IN | BODY MASS INDEX: 40.43 KG/M2 | HEART RATE: 81 BPM | WEIGHT: 315 LBS | SYSTOLIC BLOOD PRESSURE: 137 MMHG | RESPIRATION RATE: 17 BRPM

## 2020-12-05 DIAGNOSIS — E78.5 DYSLIPIDEMIA: ICD-10-CM

## 2020-12-05 DIAGNOSIS — I10 HYPERTENSION GOAL BP (BLOOD PRESSURE) < 130/80: ICD-10-CM

## 2020-12-05 DIAGNOSIS — E11.9 TYPE 2 DIABETES MELLITUS WITHOUT COMPLICATION, WITHOUT LONG-TERM CURRENT USE OF INSULIN (HCC): ICD-10-CM

## 2020-12-05 PROCEDURE — 99213 OFFICE O/P EST LOW 20 MIN: CPT | Performed by: PHYSICIAN ASSISTANT

## 2020-12-05 PROCEDURE — 3075F SYST BP GE 130 - 139MM HG: CPT | Performed by: PHYSICIAN ASSISTANT

## 2020-12-05 PROCEDURE — 3008F BODY MASS INDEX DOCD: CPT | Performed by: PHYSICIAN ASSISTANT

## 2020-12-05 PROCEDURE — 3079F DIAST BP 80-89 MM HG: CPT | Performed by: PHYSICIAN ASSISTANT

## 2020-12-05 RX ORDER — BLOOD SUGAR DIAGNOSTIC
STRIP MISCELLANEOUS
Qty: 200 STRIP | Refills: 3 | Status: SHIPPED | OUTPATIENT
Start: 2020-12-05

## 2020-12-05 RX ORDER — ROSUVASTATIN CALCIUM 5 MG/1
5 TABLET, COATED ORAL NIGHTLY
Qty: 90 TABLET | Refills: 3 | Status: SHIPPED | OUTPATIENT
Start: 2020-12-05

## 2020-12-05 RX ORDER — LOSARTAN POTASSIUM 100 MG/1
100 TABLET ORAL DAILY
Qty: 90 TABLET | Refills: 3 | Status: SHIPPED | OUTPATIENT
Start: 2020-12-05 | End: 2021-03-12 | Stop reason: ALTCHOICE

## 2020-12-05 RX ORDER — HYDROCHLOROTHIAZIDE 25 MG/1
25 TABLET ORAL DAILY
Qty: 90 TABLET | Refills: 1 | Status: SHIPPED | OUTPATIENT
Start: 2020-12-05 | End: 2021-03-12

## 2020-12-05 RX ORDER — LANCETS 30 GAUGE
EACH MISCELLANEOUS
COMMUNITY
Start: 2020-02-01

## 2020-12-05 RX ORDER — LANCETS
EACH MISCELLANEOUS
COMMUNITY
Start: 2020-02-01

## 2020-12-05 RX ORDER — EMPAGLIFLOZIN 25 MG/1
1 TABLET, FILM COATED ORAL DAILY
Qty: 90 TABLET | Refills: 1 | Status: SHIPPED | OUTPATIENT
Start: 2020-12-05 | End: 2021-05-28

## 2020-12-05 RX ORDER — AMLODIPINE BESYLATE 10 MG/1
10 TABLET ORAL DAILY
Qty: 90 TABLET | Refills: 3 | Status: SHIPPED | OUTPATIENT
Start: 2020-12-05 | End: 2021-03-12 | Stop reason: ALTCHOICE

## 2020-12-06 NOTE — PROGRESS NOTES
HPI:   HPI  37year-old male is here in the office for follow up DM. Patient is doing fine at this time. Patient denies of chest pain, SOB, N/V/C/D, fever, dizziness, syncope, abdominal pain. There are no other concerns today.     Medications:     Current O History:     Family History   Problem Relation Age of Onset   • Hypertension Father    • Diabetes Father    • Other (Kidney Stones) Father    • Breast Cancer Mother    • Hypertension Mother    • Diabetes Maternal Grandmother    • Prostate Cancer Maternal G congestion, ear discharge, ear pain, postnasal drip, rhinorrhea, sinus pressure and sore throat. Respiratory: Negative for cough, chest tightness, shortness of breath and wheezing. Cardiovascular: Negative for chest pain and palpitations.    [employer] SITagliptin Phosphate (JANUVIA) 50 MG Oral Tab      Other Visit Diagnoses     Dyslipidemia        Relevant Medications    Rosuvastatin Calcium 5 MG Oral Tab        Discussed plan of care with pt and pt is in agreement. All questions answered.  Pt to call

## 2020-12-11 ENCOUNTER — VIRTUAL PHONE E/M (OUTPATIENT)
Dept: PULMONOLOGY | Facility: CLINIC | Age: 35
End: 2020-12-11
Payer: COMMERCIAL

## 2020-12-11 ENCOUNTER — TELEPHONE (OUTPATIENT)
Dept: FAMILY MEDICINE CLINIC | Facility: CLINIC | Age: 35
End: 2020-12-11

## 2020-12-11 DIAGNOSIS — G47.33 OSA (OBSTRUCTIVE SLEEP APNEA): Primary | ICD-10-CM

## 2020-12-11 PROCEDURE — 99213 OFFICE O/P EST LOW 20 MIN: CPT | Performed by: INTERNAL MEDICINE

## 2020-12-11 NOTE — TELEPHONE ENCOUNTER
Patient states medication is to expensive. Requesting an alternative or he will not take medication.   Please advise     SITagliptin Phosphate (JANUVIA) 50 MG Oral Tab

## 2020-12-11 NOTE — PROGRESS NOTES
Virtual Video Progress Note    Price Landau verbally consents to to a Video Visit on 12/11/2020. Patient understands and accepts financial responsibility for any deductible, co-insurance and/or co-pays associated with this service.     Please note that t labored breathing. Assessment:  1. Severe sleep apnea  2. Obesity    Plan:  -Patient presents today for management of underlying obstructive sleep apnea. Patient appears to be benefiting from CPAP device.   I reviewed download data over the last [address]

## 2020-12-15 RX ORDER — ALOGLIPTIN 12.5 MG/1
12.5 TABLET, FILM COATED ORAL DAILY
Qty: 90 TABLET | Refills: 1 | Status: SHIPPED | OUTPATIENT
Start: 2020-12-15 | End: 2020-12-17

## 2020-12-17 RX ORDER — ALOGLIPTIN 12.5 MG/1
12.5 TABLET, FILM COATED ORAL DAILY
Qty: 90 TABLET | Refills: 1 | Status: SHIPPED | OUTPATIENT
Start: 2020-12-17 | End: 2021-03-12 | Stop reason: ALTCHOICE

## 2020-12-17 NOTE — TELEPHONE ENCOUNTER
Spoke with patient, (  verified ) informed that RX was sent ,     Needs med to [facility]  in [place], [place] sent to the requested pharmacy and chart is updated     Closing this encounter.

## 2020-12-18 ENCOUNTER — OFFICE VISIT (OUTPATIENT)
Dept: PODIATRY CLINIC | Facility: CLINIC | Age: 35
End: 2020-12-18
Payer: COMMERCIAL

## 2020-12-18 VITALS — HEIGHT: 74 IN | WEIGHT: 315 LBS | BODY MASS INDEX: 40.43 KG/M2

## 2020-12-18 DIAGNOSIS — E11.9 DIABETES MELLITUS TYPE 2, NONINSULIN DEPENDENT (HCC): Primary | ICD-10-CM

## 2020-12-18 PROCEDURE — 99213 OFFICE O/P EST LOW 20 MIN: CPT | Performed by: PODIATRIST

## 2020-12-18 PROCEDURE — 3008F BODY MASS INDEX DOCD: CPT | Performed by: PODIATRIST

## 2020-12-18 NOTE — PROGRESS NOTES
HPI:    Patient ID: Miky Quach. is a 37year old male. This 53-year-old diabetic presents for a yearly exam.  The last time I saw this patient was February of this year.   On specific questioning his fasting blood sugar was not checked today bu He has some interdigital maceration and fissure that is consistent with chronic tinea. I cautioned him about this finding instructed him to purchase Lamisil cream to be used twice per day for the next 10 to 14 days.   I discussed the potential for recurren

## 2021-01-08 ENCOUNTER — TELEPHONE (OUTPATIENT)
Dept: FAMILY MEDICINE CLINIC | Facility: CLINIC | Age: 36
End: 2021-01-08

## 2021-01-08 DIAGNOSIS — E11.9 DIABETES MELLITUS WITHOUT COMPLICATION (HCC): Primary | ICD-10-CM

## 2021-01-08 NOTE — TELEPHONE ENCOUNTER
Patient calling with complaint of Alogliptin Benzoate 12.5 MG Oral Tab increasing his fating blood sugar in the morning. Per patient , he is noticing  his fasting blood sugar level are averaging at 280 mg/dl. He has stopped the Alogliptin Benzoate 12. 5

## 2021-01-08 NOTE — TELEPHONE ENCOUNTER
Called and discussed BG with patient. Discontinue Alogliptin due to side effect. Patient will continue with Jardiance 25 mg PO QAM. HgA1C ordered. Patient verbalized understanding.

## 2021-03-07 ENCOUNTER — PATIENT MESSAGE (OUTPATIENT)
Dept: PULMONOLOGY | Facility: CLINIC | Age: 36
End: 2021-03-07

## 2021-03-07 ENCOUNTER — PATIENT MESSAGE (OUTPATIENT)
Dept: PODIATRY CLINIC | Facility: CLINIC | Age: 36
End: 2021-03-07

## 2021-03-08 NOTE — TELEPHONE ENCOUNTER
From: Grady Whitlock.   To: Morena Zabala DPM  Sent: 3/7/2021 11:57 AM CST  Subject: Visit Follow-up Question    I was in not too long ago and need a docs note stating the results of my visit I have to get my medical certificate for my Cdl and

## 2021-03-11 ENCOUNTER — LAB ENCOUNTER (OUTPATIENT)
Dept: LAB | Facility: HOSPITAL | Age: 36
End: 2021-03-11
Attending: PHYSICIAN ASSISTANT
Payer: COMMERCIAL

## 2021-03-11 ENCOUNTER — PATIENT MESSAGE (OUTPATIENT)
Dept: PODIATRY CLINIC | Facility: CLINIC | Age: 36
End: 2021-03-11

## 2021-03-11 DIAGNOSIS — E11.9 DIABETES MELLITUS WITHOUT COMPLICATION (HCC): ICD-10-CM

## 2021-03-11 LAB
EST. AVERAGE GLUCOSE BLD GHB EST-MCNC: 206 MG/DL (ref 68–126)
HBA1C MFR BLD HPLC: 8.8 % (ref ?–5.7)

## 2021-03-11 PROCEDURE — 83036 HEMOGLOBIN GLYCOSYLATED A1C: CPT

## 2021-03-11 PROCEDURE — 36415 COLL VENOUS BLD VENIPUNCTURE: CPT

## 2021-03-11 NOTE — TELEPHONE ENCOUNTER
From: Valeria Rodriguez.   To: Sandra Hardin DPM  Sent: 3/11/2021 5:30 AM CST  Subject: Other    How do I get the note I cannot find it in [facility]

## 2021-03-12 ENCOUNTER — OFFICE VISIT (OUTPATIENT)
Dept: FAMILY MEDICINE CLINIC | Facility: CLINIC | Age: 36
End: 2021-03-12
Payer: COMMERCIAL

## 2021-03-12 ENCOUNTER — TELEPHONE (OUTPATIENT)
Dept: FAMILY MEDICINE CLINIC | Facility: CLINIC | Age: 36
End: 2021-03-12

## 2021-03-12 VITALS
SYSTOLIC BLOOD PRESSURE: 142 MMHG | DIASTOLIC BLOOD PRESSURE: 88 MMHG | BODY MASS INDEX: 40.43 KG/M2 | WEIGHT: 315 LBS | HEART RATE: 69 BPM | HEIGHT: 74 IN

## 2021-03-12 DIAGNOSIS — E78.2 MIXED HYPERLIPIDEMIA: ICD-10-CM

## 2021-03-12 DIAGNOSIS — E11.9 TYPE 2 DIABETES MELLITUS WITHOUT COMPLICATION, WITHOUT LONG-TERM CURRENT USE OF INSULIN (HCC): ICD-10-CM

## 2021-03-12 DIAGNOSIS — Z00.00 ROUTINE GENERAL MEDICAL EXAMINATION AT A HEALTH CARE FACILITY: Primary | ICD-10-CM

## 2021-03-12 DIAGNOSIS — I10 HYPERTENSION GOAL BP (BLOOD PRESSURE) < 130/80: ICD-10-CM

## 2021-03-12 PROCEDURE — 3079F DIAST BP 80-89 MM HG: CPT | Performed by: PHYSICIAN ASSISTANT

## 2021-03-12 PROCEDURE — 90732 PPSV23 VACC 2 YRS+ SUBQ/IM: CPT | Performed by: PHYSICIAN ASSISTANT

## 2021-03-12 PROCEDURE — 3008F BODY MASS INDEX DOCD: CPT | Performed by: PHYSICIAN ASSISTANT

## 2021-03-12 PROCEDURE — 3077F SYST BP >= 140 MM HG: CPT | Performed by: PHYSICIAN ASSISTANT

## 2021-03-12 PROCEDURE — 90471 IMMUNIZATION ADMIN: CPT | Performed by: PHYSICIAN ASSISTANT

## 2021-03-12 PROCEDURE — 99395 PREV VISIT EST AGE 18-39: CPT | Performed by: PHYSICIAN ASSISTANT

## 2021-03-12 RX ORDER — PEN NEEDLE, DIABETIC 30 GX3/16"
1 NEEDLE, DISPOSABLE MISCELLANEOUS
Qty: 30 EACH | Refills: 0 | Status: SHIPPED | OUTPATIENT
Start: 2021-03-12

## 2021-03-12 RX ORDER — SEMAGLUTIDE 1.34 MG/ML
0.25 INJECTION, SOLUTION SUBCUTANEOUS
Qty: 1 PEN | Refills: 0 | Status: SHIPPED | OUTPATIENT
Start: 2021-03-12 | End: 2021-04-09

## 2021-03-12 RX ORDER — LOSARTAN POTASSIUM 100 MG/1
100 TABLET ORAL DAILY
Qty: 90 TABLET | Refills: 3 | Status: SHIPPED | OUTPATIENT
Start: 2021-03-12 | End: 2021-06-10

## 2021-03-12 RX ORDER — HYDROCHLOROTHIAZIDE 25 MG/1
25 TABLET ORAL DAILY
Qty: 90 TABLET | Refills: 1 | Status: SHIPPED | OUTPATIENT
Start: 2021-03-12 | End: 2021-12-12

## 2021-03-12 RX ORDER — DULAGLUTIDE 0.75 MG/.5ML
0.75 INJECTION, SOLUTION SUBCUTANEOUS WEEKLY
Qty: 4 PEN | Refills: 1 | Status: SHIPPED | OUTPATIENT
Start: 2021-03-12 | End: 2021-03-12

## 2021-03-12 RX ORDER — SEMAGLUTIDE 1.34 MG/ML
0.25 INJECTION, SOLUTION SUBCUTANEOUS
Qty: 1 PEN | Refills: 0 | Status: SHIPPED | OUTPATIENT
Start: 2021-03-12 | End: 2021-03-12

## 2021-03-12 NOTE — TELEPHONE ENCOUNTER
Patient would like to verify if site has received the DOT form with both sides showing. Confirmed the clinics fax #.

## 2021-03-12 NOTE — PATIENT INSTRUCTIONS
Prevention Guidelines, Men Ages 25 to 44  Screening tests and vaccines are an important part of managing your health. A screening test is done to find possible disorders or diseases in people who don't have any symptoms.  The goal is to find a disease ear vaccine 2 doses; the second dose should be given at least 4 weeks after the first dose   Hepatitis A Men at increased risk for infection – talk with your healthcare provider 2 doses given at least 6 months apart   Hepatitis B Men at increased risk for infe be reminded to avoid intentional tanning and tanning beds. 1Those who are 25years of age, who are not up-to-date on their childhood immunizations, should get all appropriate catch-up vaccines recommended by the CDC.    Alice last reviewed this educat

## 2021-03-12 NOTE — TELEPHONE ENCOUNTER
Pt appeared in clinic today to  DOT letter. Verbal ok given by  to generated letter. Pt provided with letter at  of [address] [address] office.

## 2021-03-12 NOTE — TELEPHONE ENCOUNTER
Spoke to patient, full name and  verified. Informed patient we did receive second page of forms and was also informed that forms have been faxed. Patient confirmed he already received fax.

## 2021-03-12 NOTE — TELEPHONE ENCOUNTER
Medication Form has been received but only cover sheet and page 1 out 2 received. Spoke to patient, full name and  verified. Informed patient only cover sheet and page 1 out of 2 was received.  Patient stated he will fax over page 2 once again

## 2021-03-16 ENCOUNTER — TELEPHONE (OUTPATIENT)
Dept: FAMILY MEDICINE CLINIC | Facility: CLINIC | Age: 36
End: 2021-03-16

## 2021-03-16 DIAGNOSIS — E11.9 TYPE 2 DIABETES MELLITUS WITHOUT COMPLICATION, WITHOUT LONG-TERM CURRENT USE OF INSULIN (HCC): ICD-10-CM

## 2021-03-16 NOTE — TELEPHONE ENCOUNTER
Please call the payer at [phone] to complete this PA. To initiate an authorization request for this medication, please contact [facility] at [phone].      I called [facility] and they will be faxing over the forms in a few hours to fill them out and fax i

## 2021-03-16 NOTE — TELEPHONE ENCOUNTER
Fax received. Prior authorization needed:    •  Semaglutide,0.25 or 0.5MG/DOS, (OZEMPIC, 0.25 OR 0.5 MG/DOSE,) 2 MG/1.5ML Subcutaneous Solution Pen-injector, Inject 0.25 mg into the skin every 7 days for 28 days. , Disp: 1 pen, Rfl: 0    KEY:[id number]

## 2021-04-20 RX ORDER — SEMAGLUTIDE 1.34 MG/ML
INJECTION, SOLUTION SUBCUTANEOUS
Qty: 1.5 ML | Refills: 0 | OUTPATIENT
Start: 2021-04-20

## 2021-04-20 NOTE — TELEPHONE ENCOUNTER
Patient is returning phone call and is requesting call back. [facility] contacted site but no answer.

## 2021-05-28 DIAGNOSIS — E11.9 TYPE 2 DIABETES MELLITUS WITHOUT COMPLICATION, WITHOUT LONG-TERM CURRENT USE OF INSULIN (HCC): ICD-10-CM

## 2021-05-28 RX ORDER — EMPAGLIFLOZIN 25 MG/1
1 TABLET, FILM COATED ORAL DAILY
Qty: 30 TABLET | Refills: 0 | Status: SHIPPED | OUTPATIENT
Start: 2021-05-28 | End: 2021-07-02

## 2021-05-28 NOTE — TELEPHONE ENCOUNTER
Pt would like a refill on his jardiance (not listed) medication. Per the patient he is out of medication. Pt states that he will get his blood work done tomorrow morning.    Pharmacy: [facility] (Listed)

## 2021-05-29 ENCOUNTER — TELEPHONE (OUTPATIENT)
Dept: FAMILY MEDICINE CLINIC | Facility: CLINIC | Age: 36
End: 2021-05-29

## 2021-05-29 ENCOUNTER — LAB ENCOUNTER (OUTPATIENT)
Dept: LAB | Facility: HOSPITAL | Age: 36
End: 2021-05-29
Attending: PHYSICIAN ASSISTANT
Payer: COMMERCIAL

## 2021-05-29 DIAGNOSIS — E11.9 DIABETES MELLITUS WITHOUT COMPLICATION (HCC): ICD-10-CM

## 2021-05-29 DIAGNOSIS — Z00.00 ROUTINE GENERAL MEDICAL EXAMINATION AT A HEALTH CARE FACILITY: ICD-10-CM

## 2021-05-29 DIAGNOSIS — E11.9 TYPE 2 DIABETES MELLITUS WITHOUT COMPLICATION, WITHOUT LONG-TERM CURRENT USE OF INSULIN (HCC): ICD-10-CM

## 2021-05-29 DIAGNOSIS — E78.2 MIXED HYPERLIPIDEMIA: ICD-10-CM

## 2021-05-29 PROCEDURE — 80053 COMPREHEN METABOLIC PANEL: CPT

## 2021-05-29 PROCEDURE — 82570 ASSAY OF URINE CREATININE: CPT

## 2021-05-29 PROCEDURE — 80061 LIPID PANEL: CPT

## 2021-05-29 PROCEDURE — 82043 UR ALBUMIN QUANTITATIVE: CPT

## 2021-05-29 PROCEDURE — 82306 VITAMIN D 25 HYDROXY: CPT

## 2021-05-29 PROCEDURE — 85025 COMPLETE CBC W/AUTO DIFF WBC: CPT

## 2021-05-29 PROCEDURE — 3061F NEG MICROALBUMINURIA REV: CPT | Performed by: PHYSICIAN ASSISTANT

## 2021-05-29 PROCEDURE — 83036 HEMOGLOBIN GLYCOSYLATED A1C: CPT

## 2021-05-29 PROCEDURE — 36415 COLL VENOUS BLD VENIPUNCTURE: CPT

## 2021-05-29 PROCEDURE — 84443 ASSAY THYROID STIM HORMONE: CPT

## 2021-05-29 NOTE — TELEPHONE ENCOUNTER
Pt calling back and PA message given. Pt scheduled for 6/1/21 to review labs    Kiran Lorenzo   5/29/2021 10:40 AM CDT Back to Top      Left message and mychart message sent. Zuleika Farris PA-C   5/29/2021 10:29 AM CDT       Uncontrolled DM.

## 2021-05-29 NOTE — TELEPHONE ENCOUNTER
----- Message from Sonali Armenta PA-C sent at 5/29/2021 10:29 AM CDT -----  Uncontrolled DM. Please schedule OV to discuss lab results.

## 2021-06-01 ENCOUNTER — OFFICE VISIT (OUTPATIENT)
Dept: FAMILY MEDICINE CLINIC | Facility: CLINIC | Age: 36
End: 2021-06-01
Payer: COMMERCIAL

## 2021-06-01 ENCOUNTER — TELEPHONE (OUTPATIENT)
Dept: FAMILY MEDICINE CLINIC | Facility: CLINIC | Age: 36
End: 2021-06-01

## 2021-06-01 VITALS
WEIGHT: 315 LBS | HEIGHT: 74 IN | BODY MASS INDEX: 40.43 KG/M2 | SYSTOLIC BLOOD PRESSURE: 132 MMHG | HEART RATE: 89 BPM | DIASTOLIC BLOOD PRESSURE: 88 MMHG

## 2021-06-01 DIAGNOSIS — E11.9 TYPE 2 DIABETES MELLITUS WITHOUT COMPLICATION, WITHOUT LONG-TERM CURRENT USE OF INSULIN (HCC): Primary | ICD-10-CM

## 2021-06-01 DIAGNOSIS — I10 ESSENTIAL HYPERTENSION: ICD-10-CM

## 2021-06-01 DIAGNOSIS — E78.2 MIXED HYPERLIPIDEMIA: ICD-10-CM

## 2021-06-01 PROCEDURE — 99213 OFFICE O/P EST LOW 20 MIN: CPT | Performed by: PHYSICIAN ASSISTANT

## 2021-06-01 PROCEDURE — 3079F DIAST BP 80-89 MM HG: CPT | Performed by: PHYSICIAN ASSISTANT

## 2021-06-01 PROCEDURE — 3008F BODY MASS INDEX DOCD: CPT | Performed by: PHYSICIAN ASSISTANT

## 2021-06-01 PROCEDURE — 3075F SYST BP GE 130 - 139MM HG: CPT | Performed by: PHYSICIAN ASSISTANT

## 2021-06-01 RX ORDER — ERGOCALCIFEROL 1.25 MG/1
50000 CAPSULE ORAL
Qty: 12 CAPSULE | Refills: 3 | Status: SHIPPED | OUTPATIENT
Start: 2021-06-01 | End: 2021-08-30

## 2021-06-01 RX ORDER — AMLODIPINE BESYLATE 10 MG/1
TABLET ORAL
COMMUNITY
Start: 2021-05-20 | End: 2021-12-12

## 2021-06-01 RX ORDER — SEMAGLUTIDE 1.34 MG/ML
0.5 INJECTION, SOLUTION SUBCUTANEOUS
Qty: 1 EACH | Refills: 3 | Status: SHIPPED | OUTPATIENT
Start: 2021-06-01 | End: 2021-06-02

## 2021-06-01 RX ORDER — PEN NEEDLE, DIABETIC 30 GX3/16"
1 NEEDLE, DISPOSABLE MISCELLANEOUS
Qty: 30 EACH | Refills: 0 | Status: SHIPPED | OUTPATIENT
Start: 2021-06-01

## 2021-06-01 RX ORDER — SEMAGLUTIDE 1.34 MG/ML
0.25 INJECTION, SOLUTION SUBCUTANEOUS
COMMUNITY
End: 2021-06-01 | Stop reason: DRUGHIGH

## 2021-06-01 RX ORDER — OMEPRAZOLE 40 MG/1
40 CAPSULE, DELAYED RELEASE ORAL DAILY
Qty: 90 CAPSULE | Refills: 1 | Status: SHIPPED | OUTPATIENT
Start: 2021-06-01 | End: 2021-08-30

## 2021-06-01 NOTE — TELEPHONE ENCOUNTER
Fax received requesting PA through cover my meds. Please use code: [id number]. Forms attached placed in providers inbox.       Current Outpatient Medications:     •  Semaglutide,0.25 or 0.5MG/DOS, (OZEMPIC, 0.25 OR 0.5 MG/DOSE,) 2 MG/1.5ML Subcutaneous Solut

## 2021-06-01 NOTE — PROGRESS NOTES
HPI:   HPI  37year-old male is here in the office for lab results. Patient is doing fine at this time. Patient denies of chest pain, SOB, N/V/C/D, fever, dizziness, syncope, abdominal pain. There are no other concerns today.     Medications:     Current Ou Known Allergies    History:   COVID-19 Vaccine(1) Never done  Diabetes Care Dilated Eye Exam due on 04/01/2020  Influenza Vaccine(Season Ended) due on 12/05/2021  Diabetes Care A1C due on 08/29/2021  Annual Depression Screen due on 03/12/2022  Annual Physi per Week:       Minutes of Exercise per Session:   Stress:       Feeling of Stress :   Social Connections:       Frequency of Communication with Friends and Family:       Frequency of Social Gatherings with Friends and Family:       Attends [employer] normal.      Breath sounds: Normal breath sounds. No wheezing or rales. Chest:      Chest wall: No tenderness. Lymphadenopathy:      Cervical: No cervical adenopathy. Skin:     Findings: No rash.    Neurological:      Mental Status: He is alert and or

## 2021-06-02 RX ORDER — SEMAGLUTIDE 1.34 MG/ML
0.5 INJECTION, SOLUTION SUBCUTANEOUS
Qty: 1 EACH | Refills: 3 | Status: SHIPPED | OUTPATIENT
Start: 2021-06-02 | End: 2021-07-02

## 2021-06-04 NOTE — TELEPHONE ENCOUNTER
Spoke with patient ( verified) and relayed Patricia's message below and [facility] # for f/u--patient verbalizes understanding and agreement. No further questions/concerns at this time.     Order History  Outpatient  Date/Time Action Taken User Additional Inf

## 2021-07-02 DIAGNOSIS — E11.9 TYPE 2 DIABETES MELLITUS WITHOUT COMPLICATION, WITHOUT LONG-TERM CURRENT USE OF INSULIN (HCC): ICD-10-CM

## 2021-07-02 RX ORDER — EMPAGLIFLOZIN 25 MG/1
1 TABLET, FILM COATED ORAL DAILY
Qty: 90 TABLET | Refills: 1 | Status: SHIPPED | OUTPATIENT
Start: 2021-07-02 | End: 2021-09-30

## 2021-07-27 ENCOUNTER — TELEMEDICINE (OUTPATIENT)
Dept: FAMILY MEDICINE CLINIC | Facility: CLINIC | Age: 36
End: 2021-07-27

## 2021-07-27 ENCOUNTER — NURSE TRIAGE (OUTPATIENT)
Dept: FAMILY MEDICINE CLINIC | Facility: CLINIC | Age: 36
End: 2021-07-27

## 2021-07-27 DIAGNOSIS — R19.7 DIARRHEA OF PRESUMED INFECTIOUS ORIGIN: Primary | ICD-10-CM

## 2021-07-27 PROCEDURE — 99213 OFFICE O/P EST LOW 20 MIN: CPT | Performed by: PHYSICIAN ASSISTANT

## 2021-07-27 NOTE — TELEPHONE ENCOUNTER
Pt stated that on Saturday morning he started to have diarrhea and he did take imodium and tums and it stopped the diarrhea for 1 day but now he has the diarrhea again. Pt denied having a fever temp is 97.1, no bloody diarrhea ,slight abd cramping.  Pt is a

## 2021-07-29 NOTE — PROGRESS NOTES
HPI:   Diarrhea   This is a new problem. Episode onset: 3 days. The problem occurs 5 to 10 times per day. The problem has been gradually improving. The stool consistency is described as watery.  The patient states that diarrhea does not awaken him from slee (Jeanette Score 2) w/Device Does not apply Kit U UTD         Allergies:   No Known Allergies    History:   Diabetes Care Dilated Eye Exam due on 04/01/2020  Diabetes Care A1C due on 08/29/2021  Influenza Vaccine(1) due on 10/01/2021  Annual Depression Scre Transportation (Medical):       Lack of Transportation (Non-Medical):   Physical Activity:       Days of Exercise per Week:       Minutes of Exercise per Session:   Stress:       Feeling of Stress :   Social Connections:       Frequency of Communication wi generic store brand can also help with diarrhea. Video time: 11 minutes.

## 2021-10-04 ENCOUNTER — TELEPHONE (OUTPATIENT)
Dept: FAMILY MEDICINE CLINIC | Facility: CLINIC | Age: 36
End: 2021-10-04

## 2021-10-09 ENCOUNTER — OFFICE VISIT (OUTPATIENT)
Dept: FAMILY MEDICINE CLINIC | Facility: CLINIC | Age: 36
End: 2021-10-09
Payer: COMMERCIAL

## 2021-10-09 VITALS
BODY MASS INDEX: 40.43 KG/M2 | WEIGHT: 315 LBS | SYSTOLIC BLOOD PRESSURE: 132 MMHG | DIASTOLIC BLOOD PRESSURE: 86 MMHG | HEART RATE: 72 BPM | HEIGHT: 74 IN

## 2021-10-09 DIAGNOSIS — E11.9 TYPE 2 DIABETES MELLITUS WITHOUT COMPLICATION, WITHOUT LONG-TERM CURRENT USE OF INSULIN (HCC): Primary | ICD-10-CM

## 2021-10-09 PROCEDURE — 3075F SYST BP GE 130 - 139MM HG: CPT | Performed by: PHYSICIAN ASSISTANT

## 2021-10-09 PROCEDURE — 3052F HG A1C>EQUAL 8.0%<EQUAL 9.0%: CPT | Performed by: PHYSICIAN ASSISTANT

## 2021-10-09 PROCEDURE — 3079F DIAST BP 80-89 MM HG: CPT | Performed by: PHYSICIAN ASSISTANT

## 2021-10-09 PROCEDURE — 3008F BODY MASS INDEX DOCD: CPT | Performed by: PHYSICIAN ASSISTANT

## 2021-10-09 PROCEDURE — 99213 OFFICE O/P EST LOW 20 MIN: CPT | Performed by: PHYSICIAN ASSISTANT

## 2021-10-09 PROCEDURE — 83036 HEMOGLOBIN GLYCOSYLATED A1C: CPT | Performed by: PHYSICIAN ASSISTANT

## 2021-10-09 RX ORDER — PEN NEEDLE, DIABETIC 30 GX3/16"
1 NEEDLE, DISPOSABLE MISCELLANEOUS
Qty: 30 EACH | Refills: 5 | Status: SHIPPED | OUTPATIENT
Start: 2021-10-09

## 2021-10-09 RX ORDER — SEMAGLUTIDE 1.34 MG/ML
INJECTION, SOLUTION SUBCUTANEOUS
COMMUNITY
End: 2021-10-09 | Stop reason: DRUGHIGH

## 2021-10-09 RX ORDER — SEMAGLUTIDE 1.34 MG/ML
1 INJECTION, SOLUTION SUBCUTANEOUS
Qty: 9 ML | Refills: 3 | Status: SHIPPED | OUTPATIENT
Start: 2021-10-09 | End: 2021-10-22

## 2021-10-09 NOTE — PROGRESS NOTES
HPI:   HPI  37year-old male is here in the office for follow up DM. Patient is doing fine at this time. Patient has not monitored BG as directed. Patient denies of chest pain, SOB, N/V/C/D, fever, dizziness, syncope, abdominal pain.  There are no other co 04/01/2020  Diabetes Care A1C due on 08/29/2021  Influenza Vaccine(1) due on 10/01/2021  Annual Depression Screen due on 03/12/2022  Annual Physical due on 03/12/2022  LDL Control due on 05/29/2022  Diabetes Care Nephropathy Screening due on 05/29/2022  Pn Not on file  Physical Activity:       Days of Exercise per Week: Not on file      Minutes of Exercise per Session: Not on file  Stress:       Feeling of Stress : Not on file  Social Connections:       Frequency of Communication with Friends and Family: Not Right eye: No discharge. Left eye: No discharge. Conjunctiva/sclera: Conjunctivae normal.   Cardiovascular:      Rate and Rhythm: Normal rate and regular rhythm.       Heart sounds: Normal heart sounds, S1 normal and S2 normal. No murmur h

## 2021-10-10 RX ORDER — EMPAGLIFLOZIN 25 MG/1
25 TABLET, FILM COATED ORAL EVERY MORNING
Qty: 90 TABLET | Refills: 1 | Status: SHIPPED | OUTPATIENT
Start: 2021-10-10 | End: 2021-12-29

## 2021-10-22 ENCOUNTER — NURSE TRIAGE (OUTPATIENT)
Dept: FAMILY MEDICINE CLINIC | Facility: CLINIC | Age: 36
End: 2021-10-22

## 2021-10-22 ENCOUNTER — TELEPHONE (OUTPATIENT)
Dept: FAMILY MEDICINE CLINIC | Facility: CLINIC | Age: 36
End: 2021-10-22

## 2021-10-22 DIAGNOSIS — E11.9 TYPE 2 DIABETES MELLITUS WITHOUT COMPLICATION, WITHOUT LONG-TERM CURRENT USE OF INSULIN (HCC): ICD-10-CM

## 2021-10-22 RX ORDER — SEMAGLUTIDE 1.34 MG/ML
1 INJECTION, SOLUTION SUBCUTANEOUS
Qty: 9 ML | Refills: 3 | Status: SHIPPED | OUTPATIENT
Start: 2021-10-22 | End: 2022-01-20

## 2021-10-22 NOTE — TELEPHONE ENCOUNTER
Action Requested: Summary for Provider     []  Critical Lab, Recommendations Needed  [] Need Additional Advice  []   FYI    []   Need Orders  [] Need Medications Sent to Pharmacy  []  Other     SUMMARY: Patient states he is having symptoms of nasal congest

## 2021-10-22 NOTE — TELEPHONE ENCOUNTER
----- Message from Migel Nguyen. sent at 10/22/2021  7:42 AM CDT -----  Regarding: Congestion   Am I able to take mucinex or anything similar

## 2021-10-22 NOTE — TELEPHONE ENCOUNTER
Rep from [facility] calling to state pt did have Ozempic Rx sent to their pharmacy, but when pt received shipment vials were cracked. Pt requesting Rx to local pharmacy. Ozempic resent to pt Kannan.

## 2021-10-22 NOTE — TELEPHONE ENCOUNTER
Patient states he went to  medication Ozempic from pharmacy, but because of his work schedule now he will have to wait until until next Friday. [facility] is closed and he can not afford medicine without the coupon.   Patient requesting call from [facility]

## 2021-10-23 RX ORDER — GLIPIZIDE 10 MG/1
10 TABLET ORAL
Qty: 60 TABLET | Refills: 0 | Status: SHIPPED | OUTPATIENT
Start: 2021-10-23 | End: 2021-11-22

## 2021-10-23 NOTE — TELEPHONE ENCOUNTER
Patient states that his last Ozempic injection was 09/30/21. He ran out of Ozempic for almost 1 month. Still waiting for Ozempic to arrive until next week. Glipizide 10 mg PO BID sent to pharmacy.  Advise patient to take discontinue Glipizide when he resume

## 2021-10-23 NOTE — TELEPHONE ENCOUNTER
Spoke with patient ( & Name verified). Advise patient to use Flonase for nasal congestion. Patient verbalized understanding.

## 2021-12-12 DIAGNOSIS — I10 HYPERTENSION GOAL BP (BLOOD PRESSURE) < 130/80: ICD-10-CM

## 2021-12-12 RX ORDER — AMLODIPINE BESYLATE 10 MG/1
10 TABLET ORAL DAILY
Qty: 90 TABLET | Refills: 1 | Status: SHIPPED | OUTPATIENT
Start: 2021-12-12 | End: 2022-06-10

## 2021-12-12 RX ORDER — HYDROCHLOROTHIAZIDE 25 MG/1
TABLET ORAL
Qty: 90 TABLET | Refills: 1 | Status: SHIPPED | OUTPATIENT
Start: 2021-12-12 | End: 2022-06-10

## 2021-12-27 ENCOUNTER — TELEPHONE (OUTPATIENT)
Dept: FAMILY MEDICINE CLINIC | Facility: CLINIC | Age: 36
End: 2021-12-27

## 2021-12-27 DIAGNOSIS — Z91.89 AT INCREASED RISK OF EXPOSURE TO COVID-19 VIRUS: Primary | ICD-10-CM

## 2021-12-27 NOTE — TELEPHONE ENCOUNTER
Pt calling to state his young son is positive for covid and pt would like to be tested. Pt states he is asymptomatic. Covid test entered per protocol. Pt advised of red flag symptoms of when to go to ER.     Pt asking what he needs to watch out for

## 2021-12-28 ENCOUNTER — PATIENT MESSAGE (OUTPATIENT)
Dept: FAMILY MEDICINE CLINIC | Facility: CLINIC | Age: 36
End: 2021-12-28

## 2021-12-29 ENCOUNTER — NURSE ONLY (OUTPATIENT)
Dept: LAB | Age: 36
End: 2021-12-29
Attending: PHYSICIAN ASSISTANT
Payer: COMMERCIAL

## 2021-12-29 ENCOUNTER — TELEPHONE (OUTPATIENT)
Dept: FAMILY MEDICINE CLINIC | Facility: CLINIC | Age: 36
End: 2021-12-29

## 2021-12-29 ENCOUNTER — LAB ENCOUNTER (OUTPATIENT)
Dept: LAB | Age: 36
End: 2021-12-29
Attending: PHYSICIAN ASSISTANT
Payer: COMMERCIAL

## 2021-12-29 DIAGNOSIS — Z91.89 AT INCREASED RISK OF EXPOSURE TO COVID-19 VIRUS: ICD-10-CM

## 2021-12-29 DIAGNOSIS — E11.9 TYPE 2 DIABETES MELLITUS WITHOUT COMPLICATION, WITHOUT LONG-TERM CURRENT USE OF INSULIN (HCC): ICD-10-CM

## 2021-12-29 LAB
EST. AVERAGE GLUCOSE BLD GHB EST-MCNC: 189 MG/DL (ref 68–126)
HBA1C MFR BLD: 8.2 % (ref ?–5.7)

## 2021-12-29 PROCEDURE — 83036 HEMOGLOBIN GLYCOSYLATED A1C: CPT

## 2021-12-29 PROCEDURE — 36415 COLL VENOUS BLD VENIPUNCTURE: CPT

## 2021-12-29 RX ORDER — EMPAGLIFLOZIN 25 MG/1
25 TABLET, FILM COATED ORAL EVERY MORNING
Qty: 90 TABLET | Refills: 1 | Status: SHIPPED | OUTPATIENT
Start: 2021-12-29

## 2021-12-29 NOTE — TELEPHONE ENCOUNTER
Pt's wife calling-pt dx;d w covid-headache. Body aches, fatigue and chills. Temp is 99.5  Unable to get in touch w provider.      Advised alternating tylenol and ibuprofen for next couple of days, treatment of symptoms, pushing fluids, rest  If symptoms wor

## 2021-12-29 NOTE — TELEPHONE ENCOUNTER
From: Kanchan Lara.   To: Nikolay Seaman PA-C  Sent: 12/28/2021 1:08 PM CST  Subject: Job Pichardo     I have four pills left and no refills I am currently quarantined daughter has Covid and potentially my son as well how can I renew my script I hav

## 2021-12-30 NOTE — TELEPHONE ENCOUNTER
Called and discussed with patient regarding his symptoms below. Advise patient quarantine and waiting for Covid result. Supportive cares discussed with patient. Patient verbalized understanding.

## 2021-12-31 LAB — SARS-COV-2 RNA RESP QL NAA+PROBE: DETECTED

## 2022-01-08 ENCOUNTER — PATIENT MESSAGE (OUTPATIENT)
Dept: FAMILY MEDICINE CLINIC | Facility: CLINIC | Age: 37
End: 2022-01-08

## 2022-01-08 NOTE — TELEPHONE ENCOUNTER
From: Maria Esther Amaro. To: Maximo Buckner PA-C  Sent: 1/8/2022 10:41 AM CST  Subject: Day 10 fatigue and aches     Woke up this morning feeling very aches and stiff just all around blah. Sugar was 145then ate two eggs and toast with water.  Nat Chairez

## 2022-01-21 ENCOUNTER — TELEMEDICINE (OUTPATIENT)
Dept: INTERNAL MEDICINE CLINIC | Facility: CLINIC | Age: 37
End: 2022-01-21

## 2022-01-21 DIAGNOSIS — G47.33 OSA (OBSTRUCTIVE SLEEP APNEA): Primary | ICD-10-CM

## 2022-01-21 PROCEDURE — 99213 OFFICE O/P EST LOW 20 MIN: CPT | Performed by: INTERNAL MEDICINE

## 2022-01-21 NOTE — PROGRESS NOTES
Telehealth Visit      I spoke with Nancy Carvajal. by secure Epic/[employer] video service on 01/21/22, verified date of birth, patient stated [place] the [place], and discussed their concerns as below:     HPI   Very pleasant 68-year-old Right Ear: External ear normal.      Left Ear: External ear normal.      Nose: Nose normal.   Eyes:      Conjunctiva/sclera: Conjunctivae normal.   Pulmonary:      Effort: Pulmonary effort is normal. No respiratory distress.    Musculoskeletal:      Cervica • Glucose Blood (ONETOUCH ULTRA BLUE) In Vitro Strip TEST TID     • Blood Glucose Monitoring Suppl (ONETOUCH ULTRA 2) w/Device Does not apply Kit U UTD         Assessment & Plan    1.  BARBARA (obstructive sleep apnea) (Primary)  Plan  Patient states he is c interactive audio and video communication.  This has been done in good david to provide continuity of care in the best interest of the provider-patient relationship, due to the COVID -19 public health crisis/national emergency where restrictions of face-to-

## 2022-02-11 ENCOUNTER — OFFICE VISIT (OUTPATIENT)
Dept: PODIATRY CLINIC | Facility: CLINIC | Age: 37
End: 2022-02-11
Payer: COMMERCIAL

## 2022-02-11 DIAGNOSIS — E11.9 DIABETES MELLITUS TYPE 2, NONINSULIN DEPENDENT (HCC): Primary | ICD-10-CM

## 2022-02-11 PROCEDURE — 99213 OFFICE O/P EST LOW 20 MIN: CPT | Performed by: PODIATRIST

## 2022-02-11 RX ORDER — SEMAGLUTIDE 1.34 MG/ML
1 INJECTION, SOLUTION SUBCUTANEOUS
COMMUNITY
Start: 2021-10-28

## 2022-02-25 ENCOUNTER — OFFICE VISIT (OUTPATIENT)
Dept: FAMILY MEDICINE CLINIC | Facility: CLINIC | Age: 37
End: 2022-02-25
Payer: COMMERCIAL

## 2022-02-25 VITALS
HEIGHT: 74 IN | WEIGHT: 315 LBS | SYSTOLIC BLOOD PRESSURE: 151 MMHG | HEART RATE: 89 BPM | DIASTOLIC BLOOD PRESSURE: 82 MMHG | BODY MASS INDEX: 40.43 KG/M2

## 2022-02-25 DIAGNOSIS — E11.9 TYPE 2 DIABETES MELLITUS WITHOUT COMPLICATION, WITHOUT LONG-TERM CURRENT USE OF INSULIN (HCC): Primary | ICD-10-CM

## 2022-02-25 DIAGNOSIS — I10 ESSENTIAL HYPERTENSION: ICD-10-CM

## 2022-02-25 DIAGNOSIS — E78.5 DYSLIPIDEMIA: ICD-10-CM

## 2022-02-25 LAB
CARTRIDGE LOT#: 877 NUMERIC
HEMOGLOBIN A1C: 8.2 % (ref 4.3–5.6)

## 2022-02-25 PROCEDURE — 83036 HEMOGLOBIN GLYCOSYLATED A1C: CPT | Performed by: PHYSICIAN ASSISTANT

## 2022-02-25 PROCEDURE — 3077F SYST BP >= 140 MM HG: CPT | Performed by: PHYSICIAN ASSISTANT

## 2022-02-25 PROCEDURE — 3052F HG A1C>EQUAL 8.0%<EQUAL 9.0%: CPT | Performed by: PHYSICIAN ASSISTANT

## 2022-02-25 PROCEDURE — 3079F DIAST BP 80-89 MM HG: CPT | Performed by: PHYSICIAN ASSISTANT

## 2022-02-25 PROCEDURE — 36415 COLL VENOUS BLD VENIPUNCTURE: CPT | Performed by: PHYSICIAN ASSISTANT

## 2022-02-25 PROCEDURE — 99213 OFFICE O/P EST LOW 20 MIN: CPT | Performed by: PHYSICIAN ASSISTANT

## 2022-02-25 PROCEDURE — 3008F BODY MASS INDEX DOCD: CPT | Performed by: PHYSICIAN ASSISTANT

## 2022-02-25 RX ORDER — GLIPIZIDE 10 MG/1
10 TABLET ORAL
Qty: 180 TABLET | Refills: 3 | Status: SHIPPED | OUTPATIENT
Start: 2022-02-25 | End: 2022-05-26

## 2022-02-25 RX ORDER — ROSUVASTATIN CALCIUM 5 MG/1
5 TABLET, COATED ORAL NIGHTLY
Qty: 90 TABLET | Refills: 3 | Status: SHIPPED | OUTPATIENT
Start: 2022-02-25

## 2022-03-10 ENCOUNTER — PATIENT MESSAGE (OUTPATIENT)
Dept: FAMILY MEDICINE CLINIC | Facility: CLINIC | Age: 37
End: 2022-03-10

## 2022-03-11 ENCOUNTER — TELEPHONE (OUTPATIENT)
Dept: FAMILY MEDICINE CLINIC | Facility: CLINIC | Age: 37
End: 2022-03-11

## 2022-03-11 ENCOUNTER — OFFICE VISIT (OUTPATIENT)
Dept: PULMONOLOGY | Facility: CLINIC | Age: 37
End: 2022-03-11
Payer: COMMERCIAL

## 2022-03-11 ENCOUNTER — PATIENT MESSAGE (OUTPATIENT)
Dept: FAMILY MEDICINE CLINIC | Facility: CLINIC | Age: 37
End: 2022-03-11

## 2022-03-11 VITALS
DIASTOLIC BLOOD PRESSURE: 85 MMHG | HEART RATE: 92 BPM | HEIGHT: 73 IN | OXYGEN SATURATION: 95 % | RESPIRATION RATE: 14 BRPM | BODY MASS INDEX: 41.75 KG/M2 | WEIGHT: 315 LBS | SYSTOLIC BLOOD PRESSURE: 132 MMHG

## 2022-03-11 DIAGNOSIS — G47.33 OSA (OBSTRUCTIVE SLEEP APNEA): Primary | ICD-10-CM

## 2022-03-11 PROCEDURE — 99213 OFFICE O/P EST LOW 20 MIN: CPT | Performed by: INTERNAL MEDICINE

## 2022-03-11 PROCEDURE — 3079F DIAST BP 80-89 MM HG: CPT | Performed by: INTERNAL MEDICINE

## 2022-03-11 PROCEDURE — 3008F BODY MASS INDEX DOCD: CPT | Performed by: INTERNAL MEDICINE

## 2022-03-11 PROCEDURE — 3075F SYST BP GE 130 - 139MM HG: CPT | Performed by: INTERNAL MEDICINE

## 2022-03-11 NOTE — TELEPHONE ENCOUNTER
From: Shaheen Robert. To: Anirudh Peng PA-C  Sent: 3/10/2022 6:05 PM CST  Subject: Doctors note     I was supposed to get a doctors note with my last visit and have not received it. 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  I will be at the [address] [place] [address] office tomorrow at 1150 for an appointment with [facility]

## 2022-03-11 NOTE — PROGRESS NOTES
Referring Physician  Torsten Dejesus PA-C    History of Present Illness  Patient seen today for follow-up visit at pulmonary clinic. Patient has been using his CPAP device with significant improvement in hypersomnia and fatigue. He is tolerating his device well. Denies any significant dyspnea symptoms. Medications  rosuvastatin 5 MG Oral Tab, Take 1 tablet (5 mg total) by mouth nightly., Disp: 90 tablet, Rfl: 3  glipiZIDE 10 MG Oral Tab, Take 1 tablet (10 mg total) by mouth 2 (two) times daily before meals. , Disp: 180 tablet, Rfl: 3  OZEMPIC, 1 MG/DOSE, 4 MG/3ML Subcutaneous Solution Pen-injector, Inject 1 mg into the skin every 7 days. , Disp: , Rfl:   Empagliflozin (JARDIANCE) 25 MG Oral Tab, Take 25 mg by mouth every morning., Disp: 90 tablet, Rfl: 1  amLODIPine 10 MG Oral Tab, Take 1 tablet (10 mg total) by mouth daily. , Disp: 90 tablet, Rfl: 1  HYDROCHLOROTHIAZIDE 25 MG Oral Tab, TAKE 1 TABLET(25 MG) BY MOUTH DAILY, Disp: 90 tablet, Rfl: 1  Insulin Pen Needle (PEN NEEDLES) 32G X 4 MM Does not apply Misc, 1 each by Does not apply route every 7 days. , Disp: 30 each, Rfl: 5  Insulin Pen Needle (PEN NEEDLES) 32G X 4 MM Does not apply Misc, 1 each by Does not apply route every 7 days. , Disp: 30 each, Rfl: 0  Insulin Pen Needle (PEN NEEDLES) 32G X 4 MM Does not apply Misc, 1 each by Does not apply route every 7 days. , Disp: 30 each, Rfl: 0  losartan 100 MG Oral Tab, Take 1 tablet (100 mg total) by mouth daily. , Disp: 90 tablet, Rfl: 3  Insulin Pen Needle (PEN NEEDLES) 32G X 4 MM Does not apply Misc, 1 each by Does not apply route every 7 days. , Disp: 30 each, Rfl: 0  OneTouch Club Lancets Fine Pt Does not apply Misc, TEST TID, Disp: , Rfl:   Lancets Does not apply Misc, TEST TID, Disp: , Rfl:   Glucose Blood (ONETOUCH VERIO) In Vitro Strip, ONE-TOUCH DELICA TEST STRIPS.  TEST 2 TIMES A DAY., Disp: 200 strip, Rfl: 3  Lancets (ONETOUCH DELICA PLUS [id number]) Does not apply Misc, TEST TID, Disp: , Rfl:   Glucose Blood (ONETOUCH ULTRA BLUE) In Vitro Strip, TEST TID, Disp: , Rfl:   Blood Glucose Monitoring Suppl (ONETOUCH ULTRA 2) w/Device Does not apply Kit, U UTD, Disp: , Rfl:     No current facility-administered medications on file prior to visit. Allergies  No Known Allergies    Physical Exam  Constitutional: no acute distress  HEENT: PERRL  Cardio: RRR, S1 S2  Respiratory: clear to auscultation bilaterally, no wheezing, rales, rhonchi, crackles  GI: abdomen soft, non tender  Extremities: no clubbing, cyanosis, edema  Neurologic: no gross motor deficits  Skin: warm, dry  Lymphatic: no supraclavicular lymphadenopathy     Assessment  1. Severe obstructive sleep apnea  2. Obesity    Plan  -Patient seen today for management of underlying obstructive sleep apnea. I am glad to hear he is benefiting from his CPAP device. Review download data over the last 30 days with 100% usage and average usage of 5 hours and 44 minutes which is acceptable. His AHI is 3.3 which is also acceptable. I advised him to continue using CPAP device on a nightly basis. I encourage weight loss if possible.   Patient benefiting from his CPAP device      Yoan Cantor, [address]

## 2022-03-12 ENCOUNTER — TELEPHONE (OUTPATIENT)
Dept: FAMILY MEDICINE CLINIC | Facility: CLINIC | Age: 37
End: 2022-03-12

## 2022-03-12 NOTE — TELEPHONE ENCOUNTER
Whit Alcala RN 3/11/2022 5:00 PM CST        ----- Message -----  From: Danie Vick. 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  I have an appointment tomorrow at [phone] if I cannot get this note tomorrow morning asap I will lose my license and cannot work please call me [phone]

## 2022-06-08 DIAGNOSIS — I10 HYPERTENSION GOAL BP (BLOOD PRESSURE) < 130/80: ICD-10-CM

## 2022-06-10 RX ORDER — HYDROCHLOROTHIAZIDE 25 MG/1
TABLET ORAL
Qty: 90 TABLET | Refills: 1 | Status: SHIPPED | OUTPATIENT
Start: 2022-06-10

## 2022-06-10 RX ORDER — AMLODIPINE BESYLATE 10 MG/1
TABLET ORAL
Qty: 90 TABLET | Refills: 1 | Status: SHIPPED | OUTPATIENT
Start: 2022-06-10

## 2022-06-10 NOTE — TELEPHONE ENCOUNTER
Please review. Protocol failed or has no protocol.     Requested Prescriptions   Pending Prescriptions Disp Refills    HYDROCHLOROTHIAZIDE 25 MG Oral Tab [Pharmacy Med Name: HYDROCHLOROTHIAZIDE 25MG TABLETS] 90 tablet 1     Sig: TAKE 1 TABLET(25 MG) BY MOUTH DAILY        Hypertensive Medications Protocol Failed - 6/8/2022  2:08 PM        Failed - CMP or BMP in past 12 months        Passed - Appointment in past 6 or next 3 months        Passed - GFR Non- > 50     Lab Results   Component Value Date    [facility]                    AMLODIPINE 10 MG Oral Tab [Pharmacy Med Name: AMLODIPINE BESYLATE 10MG TABLETS] 90 tablet 1     Sig: TAKE 1 TABLET(10 MG) BY MOUTH DAILY        Hypertensive Medications Protocol Failed - 6/8/2022  2:08 PM        Failed - CMP or BMP in past 12 months        Passed - Appointment in past 6 or next 3 months        Passed - GFR Non- > 50     Lab Results   Component Value Date    [facility] 112 05/29/2021                       Recent Outpatient Visits              3 months ago BARBARA (obstructive sleep apnea)    [address] [facility], [address] [place] [address], [place], [place]    Office Visit    3 months ago Type 2 diabetes mellitus without complication, without long-term current use of insulin [facility])    [address] [facility] Milind Bernal PA-C    Office Visit    3 months ago Diabetes mellitus type 2, noninsulin dependent [facility])    [facility], [address], [place], [place]    Office Visit    4 months ago BARBARA (obstructive sleep apnea)    Marcus Paris MD    Telemedicine    5 months ago At increased risk of exposure to COVID-19 virus    [address]     Nurse Only

## 2022-06-27 ENCOUNTER — TELEPHONE (OUTPATIENT)
Dept: FAMILY MEDICINE CLINIC | Facility: CLINIC | Age: 37
End: 2022-06-27

## 2022-06-27 RX ORDER — EMPAGLIFLOZIN 25 MG/1
TABLET, FILM COATED ORAL
Qty: 90 TABLET | Refills: 1 | Status: SHIPPED | OUTPATIENT
Start: 2022-06-27

## 2022-07-01 ENCOUNTER — PATIENT MESSAGE (OUTPATIENT)
Dept: FAMILY MEDICINE CLINIC | Facility: CLINIC | Age: 37
End: 2022-07-01

## 2022-07-01 NOTE — TELEPHONE ENCOUNTER
From: Amee Maxwell  To: Santiago Robertson. Sent: 6/27/2022 1:46 PM CDT  Subject: rx    Serene Medeiros has approved your RX request but you are due for fasting bloodwork,.  Please complete labwork for any further refills

## 2022-08-12 ENCOUNTER — LAB ENCOUNTER (OUTPATIENT)
Dept: LAB | Age: 37
End: 2022-08-12
Attending: PHYSICIAN ASSISTANT
Payer: COMMERCIAL

## 2022-08-12 ENCOUNTER — TELEPHONE (OUTPATIENT)
Dept: FAMILY MEDICINE CLINIC | Facility: CLINIC | Age: 37
End: 2022-08-12

## 2022-08-12 DIAGNOSIS — E11.9 TYPE 2 DIABETES MELLITUS WITHOUT COMPLICATION, WITHOUT LONG-TERM CURRENT USE OF INSULIN (HCC): ICD-10-CM

## 2022-08-12 DIAGNOSIS — E78.5 DYSLIPIDEMIA: ICD-10-CM

## 2022-08-12 LAB
ALBUMIN SERPL-MCNC: 3.8 G/DL (ref 3.4–5)
ALBUMIN/GLOB SERPL: 1 {RATIO} (ref 1–2)
ALP LIVER SERPL-CCNC: 76 U/L
ALT SERPL-CCNC: 61 U/L
ANION GAP SERPL CALC-SCNC: 7 MMOL/L (ref 0–18)
AST SERPL-CCNC: 28 U/L (ref 15–37)
BILIRUB SERPL-MCNC: 0.7 MG/DL (ref 0.1–2)
BUN BLD-MCNC: 20 MG/DL (ref 7–18)
BUN/CREAT SERPL: 23.8 (ref 10–20)
CALCIUM BLD-MCNC: 9.3 MG/DL (ref 8.5–10.1)
CHLORIDE SERPL-SCNC: 107 MMOL/L (ref 98–112)
CHOLEST SERPL-MCNC: 149 MG/DL (ref ?–200)
CO2 SERPL-SCNC: 28 MMOL/L (ref 21–32)
CREAT BLD-MCNC: 0.84 MG/DL
EST. AVERAGE GLUCOSE BLD GHB EST-MCNC: 183 MG/DL (ref 68–126)
FASTING PATIENT LIPID ANSWER: YES
FASTING STATUS PATIENT QL REPORTED: YES
GFR SERPLBLD BASED ON 1.73 SQ M-ARVRAT: 117 ML/MIN/1.73M2 (ref 60–?)
GLOBULIN PLAS-MCNC: 3.9 G/DL (ref 2.8–4.4)
GLUCOSE BLD-MCNC: 140 MG/DL (ref 70–99)
HBA1C MFR BLD: 8 % (ref ?–5.7)
HDLC SERPL-MCNC: 33 MG/DL (ref 40–59)
LDLC SERPL CALC-MCNC: 96 MG/DL (ref ?–100)
NONHDLC SERPL-MCNC: 116 MG/DL (ref ?–130)
OSMOLALITY SERPL CALC.SUM OF ELEC: 299 MOSM/KG (ref 275–295)
POTASSIUM SERPL-SCNC: 4.2 MMOL/L (ref 3.5–5.1)
PROT SERPL-MCNC: 7.7 G/DL (ref 6.4–8.2)
SODIUM SERPL-SCNC: 142 MMOL/L (ref 136–145)
TRIGL SERPL-MCNC: 106 MG/DL (ref 30–149)
VLDLC SERPL CALC-MCNC: 17 MG/DL (ref 0–30)

## 2022-08-12 PROCEDURE — 36415 COLL VENOUS BLD VENIPUNCTURE: CPT

## 2022-08-12 PROCEDURE — 80061 LIPID PANEL: CPT

## 2022-08-12 PROCEDURE — 3052F HG A1C>EQUAL 8.0%<EQUAL 9.0%: CPT | Performed by: PHYSICIAN ASSISTANT

## 2022-08-12 PROCEDURE — 83036 HEMOGLOBIN GLYCOSYLATED A1C: CPT

## 2022-08-12 PROCEDURE — 80053 COMPREHEN METABOLIC PANEL: CPT

## 2022-08-13 NOTE — TELEPHONE ENCOUNTER
----- Message From: Deena Jolly PA-C  Sent: 8/12/2022 1:47 PM CDT----    Please schedule to discuss lab results.
Left message for patient to call back, see message below.
Noted.
Patient scheduled for 08/19/22 for his follow up on test results. Patient requesting message sent to RUTHANN Newsome to advise he did not take his Ozemptic since the biggining of July until yesterday that he was able to obtain medication.
English

## 2022-08-19 ENCOUNTER — OFFICE VISIT (OUTPATIENT)
Dept: FAMILY MEDICINE CLINIC | Facility: CLINIC | Age: 37
End: 2022-08-19
Payer: COMMERCIAL

## 2022-08-19 VITALS
WEIGHT: 315 LBS | SYSTOLIC BLOOD PRESSURE: 146 MMHG | HEART RATE: 94 BPM | BODY MASS INDEX: 41.75 KG/M2 | DIASTOLIC BLOOD PRESSURE: 88 MMHG | HEIGHT: 73 IN

## 2022-08-19 DIAGNOSIS — I10 ESSENTIAL HYPERTENSION: ICD-10-CM

## 2022-08-19 DIAGNOSIS — Z00.00 ROUTINE GENERAL MEDICAL EXAMINATION AT A HEALTH CARE FACILITY: Primary | ICD-10-CM

## 2022-08-19 DIAGNOSIS — E11.9 TYPE 2 DIABETES MELLITUS WITHOUT COMPLICATION, WITHOUT LONG-TERM CURRENT USE OF INSULIN (HCC): ICD-10-CM

## 2022-08-19 PROCEDURE — 3077F SYST BP >= 140 MM HG: CPT | Performed by: PHYSICIAN ASSISTANT

## 2022-08-19 PROCEDURE — 3079F DIAST BP 80-89 MM HG: CPT | Performed by: PHYSICIAN ASSISTANT

## 2022-08-19 PROCEDURE — 3008F BODY MASS INDEX DOCD: CPT | Performed by: PHYSICIAN ASSISTANT

## 2022-08-19 PROCEDURE — 99395 PREV VISIT EST AGE 18-39: CPT | Performed by: PHYSICIAN ASSISTANT

## 2022-08-19 PROCEDURE — 90670 PCV13 VACCINE IM: CPT | Performed by: PHYSICIAN ASSISTANT

## 2022-08-19 PROCEDURE — 90471 IMMUNIZATION ADMIN: CPT | Performed by: PHYSICIAN ASSISTANT

## 2022-08-19 RX ORDER — GLIPIZIDE 10 MG/1
TABLET ORAL
COMMUNITY
Start: 2022-02-25 | End: 2022-08-19 | Stop reason: ALTCHOICE

## 2022-08-19 RX ORDER — SEMAGLUTIDE 1.34 MG/ML
2 INJECTION, SOLUTION SUBCUTANEOUS
Qty: 9 ML | Refills: 3 | Status: SHIPPED | OUTPATIENT
Start: 2022-08-19

## 2022-08-23 ENCOUNTER — TELEPHONE (OUTPATIENT)
Dept: FAMILY MEDICINE CLINIC | Facility: CLINIC | Age: 37
End: 2022-08-23

## 2022-08-23 DIAGNOSIS — E11.9 TYPE 2 DIABETES MELLITUS WITHOUT COMPLICATION, WITHOUT LONG-TERM CURRENT USE OF INSULIN (HCC): ICD-10-CM

## 2022-08-23 RX ORDER — SEMAGLUTIDE 1.34 MG/ML
2 INJECTION, SOLUTION SUBCUTANEOUS
Qty: 9 ML | Refills: 3 | Status: SHIPPED | OUTPATIENT
Start: 2022-08-23

## 2022-08-23 NOTE — TELEPHONE ENCOUNTER
Spoke with patient ( & Name verified). Rx sent to [facility] per patient requests. Patient will call back if any issues. Patient verbalized understanding.

## 2022-08-23 NOTE — TELEPHONE ENCOUNTER
Patient states pharmacy called - they let him know that medication was on back order. Would like to know next step.       Semaglutide, 1 MG/DOSE, (OZEMPIC, 1 MG/DOSE,) 2 MG/1.5ML Subcutaneous Solution Pen-injector

## 2022-08-31 ENCOUNTER — TELEPHONE (OUTPATIENT)
Dept: FAMILY MEDICINE CLINIC | Facility: CLINIC | Age: 37
End: 2022-08-31

## 2022-08-31 NOTE — TELEPHONE ENCOUNTER
Spoke to Kiet from the clinical department from [facility] and there is on going approval for ozempic for any dose as long as the patient has benefits. This started on 3/2021. The issue is that the pharmacy is running 9 ml per 30 days and they cover 3 ml per 28 days.      Spoke to Tim from [employer] and he ran it for 3 ml per 28 days and it went through

## 2022-10-10 ENCOUNTER — TELEPHONE (OUTPATIENT)
Dept: FAMILY MEDICINE CLINIC | Facility: CLINIC | Age: 37
End: 2022-10-10

## 2022-10-14 ENCOUNTER — TELEPHONE (OUTPATIENT)
Dept: FAMILY MEDICINE CLINIC | Facility: CLINIC | Age: 37
End: 2022-10-14

## 2022-10-14 RX ORDER — DULAGLUTIDE 1.5 MG/.5ML
1.5 INJECTION, SOLUTION SUBCUTANEOUS
Qty: 4 PEN | Refills: 1 | Status: SHIPPED
Start: 2022-10-14 | End: 2022-10-15

## 2022-10-15 RX ORDER — DULAGLUTIDE 1.5 MG/.5ML
1.5 INJECTION, SOLUTION SUBCUTANEOUS
Qty: 4 PEN | Refills: 1 | Status: SHIPPED
Start: 2022-10-15 | End: 2022-11-14

## 2022-10-18 NOTE — TELEPHONE ENCOUNTER
Judi from [facility] states they have submitted the claim for CPAP and supplies to [facility] and they are waiting for a response from [facility]. Pt informed of this. 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   Pt Patient calling back and states that after thinking about it, she does take this Medication Simvastatin and needs a refill

## 2022-10-19 ENCOUNTER — TELEPHONE (OUTPATIENT)
Dept: FAMILY MEDICINE CLINIC | Facility: CLINIC | Age: 37
End: 2022-10-19

## 2022-10-21 ENCOUNTER — PATIENT MESSAGE (OUTPATIENT)
Dept: OTHER | Age: 37
End: 2022-10-21

## 2022-10-21 NOTE — TELEPHONE ENCOUNTER
From: Amber Mcclure  To: Wilbern Hodgkins. Sent: 10/19/2022 1:26 PM CDT  Subject: trulicity    Alexandr Collier,  Have you picked up the Trulicity from the pharmacy? If so, are you using a discount card?   Louis Diaz

## 2022-10-23 NOTE — TELEPHONE ENCOUNTER
[employer] message sent. 6/1/21 Telephone encounter:  Nancy Huffman to [place], medication is not covered at local pharmacy. Script will need to be sent to [facility]. See telephone encounter 03/16/2021, PA already approved.    To initiate an authorization request for this medication, please contact [facility] at [phone]

## 2022-12-06 DIAGNOSIS — I10 HYPERTENSION GOAL BP (BLOOD PRESSURE) < 130/80: ICD-10-CM

## 2022-12-06 RX ORDER — EMPAGLIFLOZIN 25 MG/1
TABLET, FILM COATED ORAL
Qty: 90 TABLET | Refills: 1 | Status: CANCELLED | OUTPATIENT
Start: 2022-12-06

## 2022-12-06 RX ORDER — HYDROCHLOROTHIAZIDE 25 MG/1
TABLET ORAL
Qty: 90 TABLET | Refills: 1 | Status: SHIPPED | OUTPATIENT
Start: 2022-12-06

## 2022-12-06 RX ORDER — LOSARTAN POTASSIUM 100 MG/1
TABLET ORAL
Qty: 90 TABLET | Refills: 3 | Status: SHIPPED | OUTPATIENT
Start: 2022-12-06

## 2022-12-06 RX ORDER — EMPAGLIFLOZIN 25 MG/1
TABLET, FILM COATED ORAL
Qty: 90 TABLET | Refills: 1 | Status: SHIPPED | OUTPATIENT
Start: 2022-12-06

## 2022-12-06 RX ORDER — AMLODIPINE BESYLATE 10 MG/1
TABLET ORAL
Qty: 90 TABLET | Refills: 1 | Status: SHIPPED | OUTPATIENT
Start: 2022-12-06

## 2022-12-06 NOTE — TELEPHONE ENCOUNTER
Spoke to patient and informed of doctor's result note below. Pt verbalized understanding. Will try to go this weekend or next .   Pt has upcoming appt on 12/23

## 2022-12-30 ENCOUNTER — LAB ENCOUNTER (OUTPATIENT)
Dept: LAB | Age: 37
End: 2022-12-30
Attending: PHYSICIAN ASSISTANT
Payer: COMMERCIAL

## 2022-12-30 ENCOUNTER — OFFICE VISIT (OUTPATIENT)
Dept: FAMILY MEDICINE CLINIC | Facility: CLINIC | Age: 37
End: 2022-12-30
Payer: COMMERCIAL

## 2022-12-30 ENCOUNTER — OFFICE VISIT (OUTPATIENT)
Dept: PODIATRY CLINIC | Facility: CLINIC | Age: 37
End: 2022-12-30
Payer: COMMERCIAL

## 2022-12-30 VITALS
HEART RATE: 82 BPM | HEIGHT: 73 IN | WEIGHT: 315 LBS | DIASTOLIC BLOOD PRESSURE: 85 MMHG | SYSTOLIC BLOOD PRESSURE: 129 MMHG | BODY MASS INDEX: 41.75 KG/M2

## 2022-12-30 DIAGNOSIS — E11.9 TYPE 2 DIABETES MELLITUS WITHOUT COMPLICATION, WITHOUT LONG-TERM CURRENT USE OF INSULIN (HCC): ICD-10-CM

## 2022-12-30 DIAGNOSIS — E66.01 MORBID OBESITY (HCC): ICD-10-CM

## 2022-12-30 DIAGNOSIS — E11.9 DIABETES MELLITUS TYPE 2, NONINSULIN DEPENDENT (HCC): Primary | ICD-10-CM

## 2022-12-30 DIAGNOSIS — E11.9 TYPE 2 DIABETES MELLITUS WITHOUT COMPLICATION, WITHOUT LONG-TERM CURRENT USE OF INSULIN (HCC): Primary | ICD-10-CM

## 2022-12-30 DIAGNOSIS — Z00.00 ROUTINE GENERAL MEDICAL EXAMINATION AT A HEALTH CARE FACILITY: ICD-10-CM

## 2022-12-30 DIAGNOSIS — I10 ESSENTIAL HYPERTENSION: ICD-10-CM

## 2022-12-30 DIAGNOSIS — E78.2 MIXED HYPERLIPIDEMIA: ICD-10-CM

## 2022-12-30 LAB
BASOPHILS # BLD AUTO: 0.06 X10(3) UL (ref 0–0.2)
BASOPHILS NFR BLD AUTO: 0.8 %
DEPRECATED RDW RBC AUTO: 41 FL (ref 35.1–46.3)
EOSINOPHIL # BLD AUTO: 0.19 X10(3) UL (ref 0–0.7)
EOSINOPHIL NFR BLD AUTO: 2.6 %
ERYTHROCYTE [DISTWIDTH] IN BLOOD BY AUTOMATED COUNT: 11.9 % (ref 11–15)
EST. AVERAGE GLUCOSE BLD GHB EST-MCNC: 203 MG/DL (ref 68–126)
HBA1C MFR BLD: 8.7 % (ref ?–5.7)
HCT VFR BLD AUTO: 47.1 %
HGB BLD-MCNC: 15.7 G/DL
IMM GRANULOCYTES # BLD AUTO: 0.03 X10(3) UL (ref 0–1)
IMM GRANULOCYTES NFR BLD: 0.4 %
LYMPHOCYTES # BLD AUTO: 2.87 X10(3) UL (ref 1–4)
LYMPHOCYTES NFR BLD AUTO: 39.6 %
MCH RBC QN AUTO: 31.2 PG (ref 26–34)
MCHC RBC AUTO-ENTMCNC: 33.3 G/DL (ref 31–37)
MCV RBC AUTO: 93.6 FL
MONOCYTES # BLD AUTO: 0.47 X10(3) UL (ref 0.1–1)
MONOCYTES NFR BLD AUTO: 6.5 %
NEUTROPHILS # BLD AUTO: 3.63 X10 (3) UL (ref 1.5–7.7)
NEUTROPHILS # BLD AUTO: 3.63 X10(3) UL (ref 1.5–7.7)
NEUTROPHILS NFR BLD AUTO: 50.1 %
PLATELET # BLD AUTO: 218 10(3)UL (ref 150–450)
RBC # BLD AUTO: 5.03 X10(6)UL
TSI SER-ACNC: 0.72 MIU/ML (ref 0.36–3.74)
VIT B12 SERPL-MCNC: 611 PG/ML (ref 193–986)
WBC # BLD AUTO: 7.3 X10(3) UL (ref 4–11)

## 2022-12-30 PROCEDURE — 99213 OFFICE O/P EST LOW 20 MIN: CPT | Performed by: PHYSICIAN ASSISTANT

## 2022-12-30 PROCEDURE — 99213 OFFICE O/P EST LOW 20 MIN: CPT | Performed by: PODIATRIST

## 2022-12-30 PROCEDURE — 36415 COLL VENOUS BLD VENIPUNCTURE: CPT

## 2022-12-30 PROCEDURE — 84443 ASSAY THYROID STIM HORMONE: CPT

## 2022-12-30 PROCEDURE — 3008F BODY MASS INDEX DOCD: CPT | Performed by: PHYSICIAN ASSISTANT

## 2022-12-30 PROCEDURE — 82607 VITAMIN B-12: CPT

## 2022-12-30 PROCEDURE — 83036 HEMOGLOBIN GLYCOSYLATED A1C: CPT

## 2022-12-30 PROCEDURE — 3074F SYST BP LT 130 MM HG: CPT | Performed by: PHYSICIAN ASSISTANT

## 2022-12-30 PROCEDURE — 3079F DIAST BP 80-89 MM HG: CPT | Performed by: PHYSICIAN ASSISTANT

## 2022-12-30 PROCEDURE — 85025 COMPLETE CBC W/AUTO DIFF WBC: CPT

## 2022-12-30 RX ORDER — DULAGLUTIDE 1.5 MG/.5ML
1.5 INJECTION, SOLUTION SUBCUTANEOUS
COMMUNITY
Start: 2022-12-01 | End: 2022-12-30 | Stop reason: DRUGHIGH

## 2022-12-30 RX ORDER — TIRZEPATIDE 2.5 MG/.5ML
2.5 INJECTION, SOLUTION SUBCUTANEOUS
Qty: 2 ML | Refills: 0 | Status: SHIPPED | OUTPATIENT
Start: 2022-12-30 | End: 2023-01-29

## 2022-12-30 NOTE — TELEPHONE ENCOUNTER
[facility]:P: [phone] ID: [id number]     NPI [id number] DX DM E11.9 3/2020-current     PA needed for Tirzepatide (Mounjaro) 2.5 mg / 0.5 ML  2.5 mg weekly     Pending fax for completion

## 2023-01-03 NOTE — TELEPHONE ENCOUNTER
Pharmacy requesting script sent to them since they closed it, please advice    kalani cabrera ph# [phone]

## 2023-01-04 RX ORDER — TIRZEPATIDE 2.5 MG/.5ML
2.5 INJECTION, SOLUTION SUBCUTANEOUS
Qty: 2 ML | Refills: 0 | Status: SHIPPED | OUTPATIENT
Start: 2023-01-04 | End: 2023-02-03

## 2023-01-06 ENCOUNTER — PATIENT MESSAGE (OUTPATIENT)
Dept: FAMILY MEDICINE CLINIC | Facility: CLINIC | Age: 38
End: 2023-01-06

## 2023-01-06 NOTE — TELEPHONE ENCOUNTER
Spoke to patient. He is very frustrated that this process is taking a week and said his job and license depend on having his numbers good. He said if there is a problem with the [facility], he will just go back to [facility] and needs this handled today. Triage support- please update patient as soon as possible.

## 2023-01-09 NOTE — TELEPHONE ENCOUNTER
Patient has to use [facility], not [facility] based on his plan.   Spoke to patient and he is aware   No further action

## 2023-02-09 ENCOUNTER — TELEPHONE (OUTPATIENT)
Dept: FAMILY MEDICINE CLINIC | Facility: CLINIC | Age: 38
End: 2023-02-09

## 2023-02-10 RX ORDER — TIRZEPATIDE 5 MG/.5ML
5 INJECTION, SOLUTION SUBCUTANEOUS
Qty: 6 ML | Refills: 1 | Status: SHIPPED | OUTPATIENT
Start: 2023-02-10 | End: 2023-05-11

## 2023-02-10 NOTE — TELEPHONE ENCOUNTER
Se AGAPITO 12/30/23, RX was approved     Phoned Pharmacy for clarification, [facility] Sabillon in system , limited [facility] pharmacy and not at local pharmacy, refill needed for correct pharmacy , patient notified via [employer]     rx pended, please sign if appropriate

## 2023-02-22 ENCOUNTER — PATIENT MESSAGE (OUTPATIENT)
Dept: PULMONOLOGY | Facility: CLINIC | Age: 38
End: 2023-02-22

## 2023-02-24 ENCOUNTER — TELEMEDICINE (OUTPATIENT)
Dept: PULMONOLOGY | Facility: CLINIC | Age: 38
End: 2023-02-24

## 2023-02-24 DIAGNOSIS — G47.33 OSA (OBSTRUCTIVE SLEEP APNEA): Primary | ICD-10-CM

## 2023-02-24 PROCEDURE — 99442 PHONE E/M BY PHYS 11-20 MIN: CPT | Performed by: INTERNAL MEDICINE

## 2023-02-24 NOTE — PROGRESS NOTES
Phone Visit    Referring Physician  Marijean Harada, MD    History of Present Illness  Patient seen today for follow-up visit at pulmonary clinic. Patient has been using his CPAP device with significant improvement in hypersomnia and fatigue. He is tolerating his device well. Denies any significant dyspnea symptoms. Received new CPAP device recently. Medications  Tirzepatide (MOUNJARO) 5 MG/0.5ML Subcutaneous Solution Pen-injector, Inject 5 mg into the skin every 7 days. , Disp: 6 mL, Rfl: 1  HYDROCHLOROTHIAZIDE 25 MG Oral Tab, TAKE 1 TABLET(25 MG) BY MOUTH DAILY, Disp: 90 tablet, Rfl: 1  AMLODIPINE 10 MG Oral Tab, TAKE 1 TABLET(10 MG) BY MOUTH DAILY, Disp: 90 tablet, Rfl: 1  JARDIANCE 25 MG Oral Tab, TAKE 1 TABLET BY MOUTH EVERY MORNING, Disp: 90 tablet, Rfl: 1  LOSARTAN 100 MG Oral Tab, TAKE 1 TABLET(100 MG) BY MOUTH DAILY, Disp: 90 tablet, Rfl: 3  rosuvastatin 5 MG Oral Tab, Take 1 tablet (5 mg total) by mouth nightly., Disp: 90 tablet, Rfl: 3  Insulin Pen Needle (PEN NEEDLES) 32G X 4 MM Does not apply Misc, 1 each by Does not apply route every 7 days. , Disp: 30 each, Rfl: 5    No current facility-administered medications on file prior to visit. Allergies  No Known Allergies    Physical Exam  Did not perform phone visit    Assessment  1. Severe obstructive sleep apnea  2. Obesity    Plan  -Patient seen today for management of underlying obstructive sleep apnea. I am glad to hear he is benefiting from his CPAP device. Review download data over the last 30 days with 100% usage and average usage of 6 hours and 20 minutes which is acceptable. His AHI is 2.8 which is also acceptable. I advised him to continue using CPAP device on a nightly basis. I encourage weight loss if possible.   Patient benefiting from his CPAP device    Total time spent 11 minutes      [facility], [address] [place] [address]

## 2023-02-28 ENCOUNTER — TELEPHONE (OUTPATIENT)
Dept: FAMILY MEDICINE CLINIC | Facility: CLINIC | Age: 38
End: 2023-02-28

## 2023-02-28 NOTE — TELEPHONE ENCOUNTER
Patient calling (identified name and ) requesting an annual note for his [employer] physical. Last note written 3/11/22. Patient asking if he needs to be seen or if the note can be sent via [address] Please advise.

## 2023-02-28 NOTE — TELEPHONE ENCOUNTER
Letter has been sent via Mychart. negative Regular rate & rhythm, normal S1, S2; no murmurs, gallops or rubs; no S3, S4 detailed exam

## 2023-03-02 ENCOUNTER — PATIENT MESSAGE (OUTPATIENT)
Dept: FAMILY MEDICINE CLINIC | Facility: CLINIC | Age: 38
End: 2023-03-02

## 2023-03-03 ENCOUNTER — TELEPHONE (OUTPATIENT)
Dept: FAMILY MEDICINE CLINIC | Facility: CLINIC | Age: 38
End: 2023-03-03

## 2023-03-03 ENCOUNTER — LAB ENCOUNTER (OUTPATIENT)
Dept: LAB | Age: 38
End: 2023-03-03
Attending: PHYSICIAN ASSISTANT
Payer: COMMERCIAL

## 2023-03-03 DIAGNOSIS — E11.9 DIABETES MELLITUS WITHOUT COMPLICATION (HCC): ICD-10-CM

## 2023-03-03 LAB
CREAT UR-SCNC: 52.9 MG/DL
EST. AVERAGE GLUCOSE BLD GHB EST-MCNC: 203 MG/DL (ref 68–126)
HBA1C MFR BLD: 8.7 % (ref ?–5.7)
MICROALBUMIN UR-MCNC: <0.5 MG/DL

## 2023-03-03 PROCEDURE — 82043 UR ALBUMIN QUANTITATIVE: CPT

## 2023-03-03 PROCEDURE — 82570 ASSAY OF URINE CREATININE: CPT

## 2023-03-03 PROCEDURE — 36415 COLL VENOUS BLD VENIPUNCTURE: CPT

## 2023-03-03 PROCEDURE — 83036 HEMOGLOBIN GLYCOSYLATED A1C: CPT

## 2023-03-03 PROCEDURE — 3061F NEG MICROALBUMINURIA REV: CPT | Performed by: PHYSICIAN ASSISTANT

## 2023-03-03 NOTE — TELEPHONE ENCOUNTER
Pt stopped by the [address] [place] [address] [place] office (03/03/2023) and dropped of a  medication form. If possible would like form to be completed today, and will  when ready. Form will be placed in red folder.

## 2023-03-03 NOTE — TELEPHONE ENCOUNTER
See Telephone Encounter from today --> form was dropped of and completed, patient is aware per note.

## 2023-06-03 ENCOUNTER — WALK IN (OUTPATIENT)
Dept: URGENT CARE | Age: 38
End: 2023-06-03

## 2023-06-03 VITALS
OXYGEN SATURATION: 98 % | BODY MASS INDEX: 41.75 KG/M2 | DIASTOLIC BLOOD PRESSURE: 80 MMHG | WEIGHT: 315 LBS | SYSTOLIC BLOOD PRESSURE: 132 MMHG | RESPIRATION RATE: 18 BRPM | TEMPERATURE: 98.2 F | HEIGHT: 73 IN | HEART RATE: 77 BPM

## 2023-06-03 DIAGNOSIS — H61.21 IMPACTED CERUMEN OF RIGHT EAR: Primary | ICD-10-CM

## 2023-06-03 DIAGNOSIS — J01.10 ACUTE NON-RECURRENT FRONTAL SINUSITIS: ICD-10-CM

## 2023-06-03 DIAGNOSIS — H60.502 ACUTE NONINFECTIVE OTITIS EXTERNA OF LEFT EAR, UNSPECIFIED TYPE: ICD-10-CM

## 2023-06-03 DIAGNOSIS — H66.002 NON-RECURRENT ACUTE SUPPURATIVE OTITIS MEDIA OF LEFT EAR WITHOUT SPONTANEOUS RUPTURE OF TYMPANIC MEMBRANE: ICD-10-CM

## 2023-06-03 PROCEDURE — 99204 OFFICE O/P NEW MOD 45 MIN: CPT | Performed by: NURSE PRACTITIONER

## 2023-06-03 RX ORDER — HYDROCHLOROTHIAZIDE 25 MG/1
TABLET ORAL
COMMUNITY
Start: 2022-12-06

## 2023-06-03 RX ORDER — IPRATROPIUM BROMIDE 21 UG/1
2 SPRAY, METERED NASAL EVERY 12 HOURS
Qty: 30 ML | Refills: 2 | Status: SHIPPED | OUTPATIENT
Start: 2023-06-03

## 2023-06-03 RX ORDER — AMOXICILLIN 875 MG/1
875 TABLET, COATED ORAL 2 TIMES DAILY
Qty: 20 TABLET | Refills: 0 | Status: SHIPPED | OUTPATIENT
Start: 2023-06-03 | End: 2023-06-13

## 2023-06-03 RX ORDER — AMLODIPINE BESYLATE 10 MG/1
TABLET ORAL
COMMUNITY
Start: 2022-12-06

## 2023-06-03 RX ORDER — GUAIFENESIN 400 MG/1
400 TABLET ORAL EVERY 8 HOURS
Qty: 84 TABLET | Refills: 0 | Status: SHIPPED | OUTPATIENT
Start: 2023-06-03

## 2023-06-03 RX ORDER — TIRZEPATIDE 5 MG/.5ML
INJECTION, SOLUTION SUBCUTANEOUS
COMMUNITY
Start: 2023-02-10

## 2023-06-03 RX ORDER — LOSARTAN POTASSIUM 100 MG/1
TABLET ORAL
COMMUNITY
Start: 2022-12-06

## 2023-06-03 ASSESSMENT — ENCOUNTER SYMPTOMS
VOMITING: 0
FEVER: 0
DIARRHEA: 0
COUGH: 1
HEADACHES: 1
SORE THROAT: 0
NAUSEA: 0
CHILLS: 0

## 2023-06-03 ASSESSMENT — PAIN SCALES - GENERAL: PAINLEVEL: 0

## 2023-08-11 RX ORDER — OMEPRAZOLE 40 MG/1
40 CAPSULE, DELAYED RELEASE ORAL DAILY
Qty: 90 CAPSULE | Refills: 3 | Status: SHIPPED | OUTPATIENT
Start: 2023-08-11 | End: 2024-08-05

## 2023-08-11 NOTE — TELEPHONE ENCOUNTER
Patient states Kannan requested for the patient to call our office to get a new prescription for Omeprazole medication - 90 supply.

## 2023-08-12 NOTE — TELEPHONE ENCOUNTER
Routed to [place] PA for advise, thanks. Last ref 6-1-21 # 90 + 1        Refill passed per [facility] protocol   Requested Prescriptions   Pending Prescriptions Disp Refills    Omeprazole 40 MG Oral Capsule Delayed Release 90 capsule 3     Sig: Take 1 capsule (40 mg total) by mouth daily.        Gastrointestional Medication Protocol Passed - 8/11/2023 11:35 AM        Passed - In person appointment or virtual visit in the past 12 mos or appointment in next 3 mos     Recent Outpatient Visits              5 months ago BARBARA (obstructive sleep apnea)    [facility], [address] [place] [address]    Telemedicine    7 months ago Type 2 diabetes mellitus without complication, without long-term current use of insulin [facility]    [address], [address] [facility], [place] Robinson Revere, PA-C    Office Visit    7 months ago Diabetes mellitus type 2, noninsulin dependent [facility])    [address], [address], [place], [place]    Office Visit    11 months ago Routine general medical examination at a health care facility    [address] [facility] [place]    Office Visit    1 year ago BARBARA (obstructive sleep apnea)    [address], [address] [facility], [address] [place] [address]    Office Visit

## 2023-09-27 ENCOUNTER — TELEPHONE (OUTPATIENT)
Dept: FAMILY MEDICINE CLINIC | Facility: CLINIC | Age: 38
End: 2023-09-27

## 2023-09-27 NOTE — TELEPHONE ENCOUNTER
Tried calling patient, unable to leave vm due to it not being set up. Will send message via [employer].

## 2023-09-27 NOTE — TELEPHONE ENCOUNTER
Provider Macrina Royal has placed lab orders for patient to complete prior to his appointment on 9/29.

## 2023-09-29 ENCOUNTER — LAB ENCOUNTER (OUTPATIENT)
Dept: LAB | Age: 38
End: 2023-09-29
Attending: PHYSICIAN ASSISTANT
Payer: COMMERCIAL

## 2023-09-29 ENCOUNTER — OFFICE VISIT (OUTPATIENT)
Dept: FAMILY MEDICINE CLINIC | Facility: CLINIC | Age: 38
End: 2023-09-29

## 2023-09-29 VITALS
DIASTOLIC BLOOD PRESSURE: 86 MMHG | HEIGHT: 73 IN | HEART RATE: 72 BPM | BODY MASS INDEX: 41.75 KG/M2 | SYSTOLIC BLOOD PRESSURE: 132 MMHG | WEIGHT: 315 LBS

## 2023-09-29 DIAGNOSIS — E78.5 DYSLIPIDEMIA: ICD-10-CM

## 2023-09-29 DIAGNOSIS — I10 HYPERTENSION GOAL BP (BLOOD PRESSURE) < 130/80: ICD-10-CM

## 2023-09-29 DIAGNOSIS — E11.9 TYPE 2 DIABETES MELLITUS WITHOUT COMPLICATION, WITHOUT LONG-TERM CURRENT USE OF INSULIN (HCC): ICD-10-CM

## 2023-09-29 DIAGNOSIS — E11.9 DIABETES MELLITUS WITHOUT COMPLICATION (HCC): ICD-10-CM

## 2023-09-29 DIAGNOSIS — E78.2 MIXED HYPERLIPIDEMIA: ICD-10-CM

## 2023-09-29 DIAGNOSIS — K21.9 GASTROESOPHAGEAL REFLUX DISEASE WITHOUT ESOPHAGITIS: ICD-10-CM

## 2023-09-29 DIAGNOSIS — Z00.00 ROUTINE GENERAL MEDICAL EXAMINATION AT A HEALTH CARE FACILITY: Primary | ICD-10-CM

## 2023-09-29 DIAGNOSIS — I10 ESSENTIAL HYPERTENSION: ICD-10-CM

## 2023-09-29 LAB
ALBUMIN SERPL-MCNC: 3.8 G/DL (ref 3.4–5)
ALBUMIN/GLOB SERPL: 1 {RATIO} (ref 1–2)
ALP LIVER SERPL-CCNC: 73 U/L
ALT SERPL-CCNC: 74 U/L
ANION GAP SERPL CALC-SCNC: 7 MMOL/L (ref 0–18)
AST SERPL-CCNC: 29 U/L (ref 15–37)
BILIRUB SERPL-MCNC: 1 MG/DL (ref 0.1–2)
BUN BLD-MCNC: 17 MG/DL (ref 7–18)
BUN/CREAT SERPL: 16.3 (ref 10–20)
CALCIUM BLD-MCNC: 9.2 MG/DL (ref 8.5–10.1)
CHLORIDE SERPL-SCNC: 108 MMOL/L (ref 98–112)
CHOLEST SERPL-MCNC: 167 MG/DL (ref ?–200)
CO2 SERPL-SCNC: 27 MMOL/L (ref 21–32)
CREAT BLD-MCNC: 1.04 MG/DL
EGFRCR SERPLBLD CKD-EPI 2021: 96 ML/MIN/1.73M2 (ref 60–?)
EST. AVERAGE GLUCOSE BLD GHB EST-MCNC: 192 MG/DL (ref 68–126)
FASTING PATIENT LIPID ANSWER: YES
FASTING STATUS PATIENT QL REPORTED: YES
GLOBULIN PLAS-MCNC: 3.8 G/DL (ref 2.8–4.4)
GLUCOSE BLD-MCNC: 136 MG/DL (ref 70–99)
HBA1C MFR BLD: 8.3 % (ref ?–5.7)
HDLC SERPL-MCNC: 36 MG/DL (ref 40–59)
LDLC SERPL CALC-MCNC: 111 MG/DL (ref ?–100)
NONHDLC SERPL-MCNC: 131 MG/DL (ref ?–130)
OSMOLALITY SERPL CALC.SUM OF ELEC: 298 MOSM/KG (ref 275–295)
POTASSIUM SERPL-SCNC: 3.7 MMOL/L (ref 3.5–5.1)
PROT SERPL-MCNC: 7.6 G/DL (ref 6.4–8.2)
SODIUM SERPL-SCNC: 142 MMOL/L (ref 136–145)
TRIGL SERPL-MCNC: 108 MG/DL (ref 30–149)
VLDLC SERPL CALC-MCNC: 19 MG/DL (ref 0–30)

## 2023-09-29 PROCEDURE — 3075F SYST BP GE 130 - 139MM HG: CPT | Performed by: PHYSICIAN ASSISTANT

## 2023-09-29 PROCEDURE — 3008F BODY MASS INDEX DOCD: CPT | Performed by: PHYSICIAN ASSISTANT

## 2023-09-29 PROCEDURE — 36415 COLL VENOUS BLD VENIPUNCTURE: CPT

## 2023-09-29 PROCEDURE — 3079F DIAST BP 80-89 MM HG: CPT | Performed by: PHYSICIAN ASSISTANT

## 2023-09-29 PROCEDURE — 80053 COMPREHEN METABOLIC PANEL: CPT

## 2023-09-29 PROCEDURE — 99395 PREV VISIT EST AGE 18-39: CPT | Performed by: PHYSICIAN ASSISTANT

## 2023-09-29 PROCEDURE — 80061 LIPID PANEL: CPT

## 2023-09-29 PROCEDURE — 83036 HEMOGLOBIN GLYCOSYLATED A1C: CPT

## 2023-09-29 PROCEDURE — 3052F HG A1C>EQUAL 8.0%<EQUAL 9.0%: CPT | Performed by: PHYSICIAN ASSISTANT

## 2023-09-29 RX ORDER — LOSARTAN POTASSIUM 100 MG/1
100 TABLET ORAL DAILY
Qty: 90 TABLET | Refills: 3 | Status: SHIPPED | OUTPATIENT
Start: 2023-09-29

## 2023-09-29 RX ORDER — EMPAGLIFLOZIN 25 MG/1
1 TABLET, FILM COATED ORAL EVERY MORNING
Qty: 90 TABLET | Refills: 3 | Status: SHIPPED | OUTPATIENT
Start: 2023-09-29

## 2023-09-29 RX ORDER — OMEPRAZOLE 40 MG/1
40 CAPSULE, DELAYED RELEASE ORAL DAILY
Qty: 90 CAPSULE | Refills: 3 | Status: SHIPPED | OUTPATIENT
Start: 2023-09-29 | End: 2024-09-23

## 2023-09-29 RX ORDER — AMLODIPINE BESYLATE 10 MG/1
10 TABLET ORAL DAILY
Qty: 90 TABLET | Refills: 3 | Status: SHIPPED | OUTPATIENT
Start: 2023-09-29

## 2023-09-29 RX ORDER — TIRZEPATIDE 10 MG/.5ML
10 INJECTION, SOLUTION SUBCUTANEOUS
Qty: 2 ML | Refills: 0 | Status: SHIPPED | OUTPATIENT
Start: 2023-09-29 | End: 2023-10-29

## 2023-09-29 RX ORDER — ROSUVASTATIN CALCIUM 5 MG/1
5 TABLET, COATED ORAL NIGHTLY
Qty: 90 TABLET | Refills: 3 | Status: SHIPPED | OUTPATIENT
Start: 2023-09-29 | End: 2023-10-01 | Stop reason: DRUGHIGH

## 2023-09-29 RX ORDER — HYDROCHLOROTHIAZIDE 25 MG/1
25 TABLET ORAL DAILY
Qty: 90 TABLET | Refills: 3 | Status: SHIPPED | OUTPATIENT
Start: 2023-09-29

## 2023-10-01 PROBLEM — K21.9 GASTROESOPHAGEAL REFLUX DISEASE WITHOUT ESOPHAGITIS: Status: ACTIVE | Noted: 2023-10-01

## 2023-10-13 ENCOUNTER — TELEPHONE (OUTPATIENT)
Dept: FAMILY MEDICINE CLINIC | Facility: CLINIC | Age: 38
End: 2023-10-13

## 2023-10-13 RX ORDER — TIRZEPATIDE 5 MG/.5ML
1 INJECTION, SOLUTION SUBCUTANEOUS WEEKLY
Qty: 4 ML | Refills: 0 | Status: SHIPPED | OUTPATIENT
Start: 2023-10-13 | End: 2023-10-13 | Stop reason: DRUGHIGH

## 2023-10-13 RX ORDER — TIRZEPATIDE 7.5 MG/.5ML
7.5 INJECTION, SOLUTION SUBCUTANEOUS
Qty: 2 ML | Refills: 1 | Status: SHIPPED | OUTPATIENT
Start: 2023-10-13 | End: 2023-11-12

## 2023-10-13 NOTE — TELEPHONE ENCOUNTER
Left detailed message (okay per RONNIE) and asked to call back if with any questions or concerns. MyChart message sent.

## 2023-10-13 NOTE — TELEPHONE ENCOUNTER
His Insurance has Mounjaro 7.5 mg ? If so, patient needs Mounjaro 7.5 mg Qweek. Can you verify with patient.

## 2023-10-13 NOTE — TELEPHONE ENCOUNTER
I prefer patient to take Mounjaro 7.5 mg Qweek. Rx sent to pharmacy. Please call and cancel Mounjaro 5 mg Qweek. Please resume Mounjaro 10 mg Qweek when medication is in stock.

## 2023-10-13 NOTE — TELEPHONE ENCOUNTER
I called the patient and they do have Mounjaro 7.5 mg available and Mounjaro 5 mg. Per the patient he need a refill on either one.

## 2023-10-13 NOTE — TELEPHONE ENCOUNTER
Per patient moujaro 10mg is out of stock. [facility] advised they have 5mg on hand. Patient is requesting new script for 5mg pen to take 10mg weekly. Pended for review.

## 2023-12-21 ENCOUNTER — OFFICE VISIT (OUTPATIENT)
Dept: FAMILY MEDICINE CLINIC | Facility: CLINIC | Age: 38
End: 2023-12-21
Payer: COMMERCIAL

## 2023-12-21 VITALS
HEART RATE: 91 BPM | SYSTOLIC BLOOD PRESSURE: 138 MMHG | BODY MASS INDEX: 41.75 KG/M2 | HEIGHT: 73 IN | WEIGHT: 315 LBS | DIASTOLIC BLOOD PRESSURE: 86 MMHG

## 2023-12-21 DIAGNOSIS — R05.1 ACUTE COUGH: Primary | ICD-10-CM

## 2023-12-21 PROCEDURE — 99213 OFFICE O/P EST LOW 20 MIN: CPT | Performed by: PHYSICIAN ASSISTANT

## 2023-12-21 PROCEDURE — 3008F BODY MASS INDEX DOCD: CPT | Performed by: PHYSICIAN ASSISTANT

## 2023-12-21 PROCEDURE — 3075F SYST BP GE 130 - 139MM HG: CPT | Performed by: PHYSICIAN ASSISTANT

## 2023-12-21 PROCEDURE — 3079F DIAST BP 80-89 MM HG: CPT | Performed by: PHYSICIAN ASSISTANT

## 2023-12-21 RX ORDER — BENZONATATE 200 MG/1
200 CAPSULE ORAL 3 TIMES DAILY PRN
Qty: 30 CAPSULE | Refills: 0 | Status: SHIPPED | OUTPATIENT
Start: 2023-12-21

## 2024-01-22 DIAGNOSIS — E11.9 TYPE 2 DIABETES MELLITUS WITHOUT COMPLICATION, WITHOUT LONG-TERM CURRENT USE OF INSULIN (HCC): ICD-10-CM

## 2024-01-24 RX ORDER — EMPAGLIFLOZIN 25 MG/1
1 TABLET, FILM COATED ORAL EVERY MORNING
Qty: 90 TABLET | Refills: 3 | Status: SHIPPED | OUTPATIENT
Start: 2024-01-24

## 2024-01-24 NOTE — TELEPHONE ENCOUNTER
Please review; protocol failed/ has no protocol    Message sent for patient to have labs done.    Requested Prescriptions   Pending Prescriptions Disp Refills    JARDIANCE 25 MG Oral Tab [Pharmacy Med Name: JARDIANCE 25MG TABLETS] 90 tablet 3     Sig: Take 1 tablet by mouth every morning.       Diabetes Medication Protocol Failed - 1/22/2024  7:01 PM        Failed - Last A1C < 7.5 and within past 6 months     Lab Results   Component Value Date    A1C 8.3 (H) 09/29/2023             Passed - In person appointment or virtual visit in the past 6 mos or appointment in next 3 mos     Recent Outpatient Visits              1 month ago Acute cough    [facility], [address], [place] Theron Solis PA-C    Office Visit    3 months ago Routine general medical examination at a health care facility    [facility], [address], [place] Theron Solis PA-C    Office Visit    11 months ago BARBARA (obstructive sleep apnea)    [facility] Beverly Uribe DO    Telemedicine    1 year ago Type 2 diabetes mellitus without complication, without long-term current use of insulin (HCC)    [facility], [address], [place] Theron Solis PA-C    Office Visit    1 year ago Diabetes mellitus type 2, noninsulin dependent (HCC)    [facility] David Mata DPM    Office Visit          Future Appointments         Provider Department Appt Notes    In 2 weeks Beverly Uribe DO [facility] annual dot certification  will also need note same as last years for use with usage for year               Passed - EGFRCR or GFRNAA > 50     GFR Evaluation  EGFRCR: 96 , resulted on 9/29/2023          Passed - GFR in the past 12 months           Recent Outpatient Visits              1 month ago Acute cough    [facility],  [place] Nguyen, Minhxuyen, PA-C    Office Visit    3 months ago Routine general medical examination at a health care facility    [facility], [place] Theron Solis PA-C    Office Visit    11 months ago BARBARA (obstructive sleep apnea)    [facility] Beverly Uribe DO    Telemedicine    1 year ago Type 2 diabetes mellitus without complication, without long-term current use of insulin (HCC)    [facility], [place] Nguyen, Minhxuyen, PA-C    Office Visit    1 year ago Diabetes mellitus type 2, noninsulin dependent (HCC)    [facility] David Mata DPM    Office Visit          Future Appointments         Provider Department Appt Notes    In 2 weeks Beverly Uribe DO [facility] annual dot certification  will also need note same as last years for use with usage for year

## 2024-02-08 ENCOUNTER — OFFICE VISIT (OUTPATIENT)
Dept: PULMONOLOGY | Facility: CLINIC | Age: 39
End: 2024-02-08
Payer: COMMERCIAL

## 2024-02-08 VITALS
OXYGEN SATURATION: 98 % | HEART RATE: 109 BPM | WEIGHT: 315 LBS | BODY MASS INDEX: 41.75 KG/M2 | SYSTOLIC BLOOD PRESSURE: 145 MMHG | RESPIRATION RATE: 16 BRPM | DIASTOLIC BLOOD PRESSURE: 83 MMHG | HEIGHT: 73 IN

## 2024-02-08 DIAGNOSIS — G47.33 OSA (OBSTRUCTIVE SLEEP APNEA): Primary | ICD-10-CM

## 2024-02-08 PROCEDURE — 99213 OFFICE O/P EST LOW 20 MIN: CPT | Performed by: INTERNAL MEDICINE

## 2024-02-08 PROCEDURE — 3079F DIAST BP 80-89 MM HG: CPT | Performed by: INTERNAL MEDICINE

## 2024-02-08 PROCEDURE — 3077F SYST BP >= 140 MM HG: CPT | Performed by: INTERNAL MEDICINE

## 2024-02-08 PROCEDURE — 3008F BODY MASS INDEX DOCD: CPT | Performed by: INTERNAL MEDICINE

## 2024-02-08 NOTE — PROGRESS NOTES
Referring Physician  Theron Solis PA-C    History of Present Illness  Patient seen today for follow-up visit at pulmonary clinic.  Patient has been using his CPAP device with significant improvement in hypersomnia and fatigue.  He is tolerating his device well.  Denies any significant dyspnea symptoms.    Medications  Current Outpatient Medications on File Prior to Visit   Medication Sig Dispense Refill    Empagliflozin (JARDIANCE) 25 MG Oral Tab Take 1 tablet by mouth every morning. 90 tablet 3    benzonatate 200 MG Oral Cap Take 1 capsule (200 mg total) by mouth 3 (three) times daily as needed for cough. 30 capsule 0    Tirzepatide (MOUNJARO) 10 MG/0.5ML Subcutaneous Solution Pen-injector Inject 10 mg into the skin every 7 days. 6 mL 1    amLODIPine 10 MG Oral Tab Take 1 tablet (10 mg total) by mouth daily. 90 tablet 3    hydroCHLOROthiazide 25 MG Oral Tab Take 1 tablet (25 mg total) by mouth daily. 90 tablet 3    losartan 100 MG Oral Tab Take 1 tablet (100 mg total) by mouth daily. 90 tablet 3    Omeprazole 40 MG Oral Capsule Delayed Release Take 1 capsule (40 mg total) by mouth daily. 90 capsule 3     No current facility-administered medications on file prior to visit.       Allergies  No Known Allergies    Physical Exam  Constitutional: no acute distress  HEENT: PERRL  Cardio: RRR, S1 S2  Respiratory: clear to auscultation bilaterally, no wheezing, rales, rhonchi, crackles  GI: abdomen soft, non tender  Extremities: no clubbing, cyanosis, edema  Neurologic: no gross motor deficits  Skin: warm, dry  Lymphatic: no supraclavicular lymphadenopathy     Assessment  1.  Severe obstructive sleep apnea  2.  Obesity    Plan  -Patient seen today for management of underlying obstructive sleep apnea.  I am glad to hear he is benefiting from his CPAP device.  Review download data over the last 30 days with 97% usage and average usage of 6 hours and 19 minutes which is acceptable.  His AHI is 2.7 which is also  acceptable.  I advised him to continue using CPAP device on a nightly basis.  I encourage weight loss if possible.  Patient benefiting from his CPAP device      Beverly Uribe,   Pulmonary Medicine  [facility]

## 2024-02-14 ENCOUNTER — LAB ENCOUNTER (OUTPATIENT)
Dept: LAB | Age: 39
End: 2024-02-14
Attending: PHYSICIAN ASSISTANT
Payer: COMMERCIAL

## 2024-02-14 DIAGNOSIS — E11.9 TYPE 2 DIABETES MELLITUS WITHOUT COMPLICATION, WITHOUT LONG-TERM CURRENT USE OF INSULIN (HCC): ICD-10-CM

## 2024-02-14 DIAGNOSIS — E78.2 MIXED HYPERLIPIDEMIA: ICD-10-CM

## 2024-02-14 DIAGNOSIS — E11.9 DIABETES MELLITUS WITHOUT COMPLICATION (HCC): ICD-10-CM

## 2024-02-14 LAB
BASOPHILS # BLD AUTO: 0.09 X10(3) UL (ref 0–0.2)
BASOPHILS NFR BLD AUTO: 1.1 %
DEPRECATED RDW RBC AUTO: 40.3 FL (ref 35.1–46.3)
EOSINOPHIL # BLD AUTO: 0.13 X10(3) UL (ref 0–0.7)
EOSINOPHIL NFR BLD AUTO: 1.6 %
ERYTHROCYTE [DISTWIDTH] IN BLOOD BY AUTOMATED COUNT: 12.2 % (ref 11–15)
EST. AVERAGE GLUCOSE BLD GHB EST-MCNC: 217 MG/DL (ref 68–126)
HBA1C MFR BLD: 9.2 % (ref ?–5.7)
HCT VFR BLD AUTO: 49.7 %
HGB BLD-MCNC: 16.7 G/DL
IMM GRANULOCYTES # BLD AUTO: 0.09 X10(3) UL (ref 0–1)
IMM GRANULOCYTES NFR BLD: 1.1 %
LYMPHOCYTES # BLD AUTO: 3.58 X10(3) UL (ref 1–4)
LYMPHOCYTES NFR BLD AUTO: 43 %
MCH RBC QN AUTO: 30.6 PG (ref 26–34)
MCHC RBC AUTO-ENTMCNC: 33.6 G/DL (ref 31–37)
MCV RBC AUTO: 91.2 FL
MONOCYTES # BLD AUTO: 0.7 X10(3) UL (ref 0.1–1)
MONOCYTES NFR BLD AUTO: 8.4 %
NEUTROPHILS # BLD AUTO: 3.73 X10 (3) UL (ref 1.5–7.7)
NEUTROPHILS # BLD AUTO: 3.73 X10(3) UL (ref 1.5–7.7)
NEUTROPHILS NFR BLD AUTO: 44.8 %
PLATELET # BLD AUTO: 217 10(3)UL (ref 150–450)
RBC # BLD AUTO: 5.45 X10(6)UL
WBC # BLD AUTO: 8.3 X10(3) UL (ref 4–11)

## 2024-02-14 PROCEDURE — 3046F HEMOGLOBIN A1C LEVEL >9.0%: CPT | Performed by: PHYSICIAN ASSISTANT

## 2024-02-14 PROCEDURE — 83036 HEMOGLOBIN GLYCOSYLATED A1C: CPT

## 2024-02-14 PROCEDURE — 85025 COMPLETE CBC W/AUTO DIFF WBC: CPT

## 2024-02-14 PROCEDURE — 36415 COLL VENOUS BLD VENIPUNCTURE: CPT

## 2024-02-15 ENCOUNTER — TELEPHONE (OUTPATIENT)
Dept: FAMILY MEDICINE CLINIC | Facility: CLINIC | Age: 39
End: 2024-02-15

## 2024-02-15 DIAGNOSIS — E11.9 TYPE 2 DIABETES MELLITUS WITHOUT COMPLICATION, WITHOUT LONG-TERM CURRENT USE OF INSULIN (HCC): Primary | ICD-10-CM

## 2024-02-15 RX ORDER — TIRZEPATIDE 15 MG/.5ML
15 INJECTION, SOLUTION SUBCUTANEOUS
Qty: 6 ML | Refills: 0 | Status: SHIPPED | OUTPATIENT
Start: 2024-02-15 | End: 2024-05-15

## 2024-02-15 RX ORDER — BLOOD SUGAR DIAGNOSTIC
STRIP MISCELLANEOUS
Refills: 0 | Status: CANCELLED | OUTPATIENT
Start: 2024-02-15

## 2024-02-15 RX ORDER — BLOOD-GLUCOSE METER
EACH MISCELLANEOUS
Qty: 1 KIT | Refills: 0 | Status: SHIPPED | OUTPATIENT
Start: 2024-02-15

## 2024-02-15 NOTE — TELEPHONE ENCOUNTER
Spoke with patient ( & Name verified).  Advise patient to change diet with limited intake of sugary drinks. Try to drink water and increase consumption of high fiber foods which are lower in glycemic load such as vegetables, fruits, whole grains, low fat dairy products, fish, chicken.  Advise patient to RTC in 4 weeks for recheck HbA1C for DOT. Patient is not cleared for DOT due to uncontrolled DM.   Patient verbalized understanding.

## 2024-02-15 NOTE — TELEPHONE ENCOUNTER
Try again later.    Attempted to call pharmacy and waited over  5 minutes, told 9 callers were ahead of me. Tried to call patient , busy signal.

## 2024-02-15 NOTE — TELEPHONE ENCOUNTER
Patient called to advise below medication is out of stock at all his local pharmacies. It is currently on national back order. Will like to know if there is an alternative or any way of finding it.             Medication Quantity Refills Start End   Tirzepatide (MOUNJARO) 12.5 MG/0.5ML Subcutaneous Solution Pen-injector 6 mL 0 2/14/2024 --   Sig:   Inject 12.5 mg into the skin every 7 days.

## 2024-02-15 NOTE — TELEPHONE ENCOUNTER
Patient needs two things:    Note for DOT certification that he is under your care. Also mention that he is receiving diabetic care and diabetic foot care through you. He states this is done by you every year. If you need to call him you can.   Order for new diabetic meter and supplies. Patient currently uses [employer] Verio Flex. He is surprised that his A1c is elevated because his average sugars were showing 130s on his current meter. Pended for your review and approval.

## 2024-02-16 RX ORDER — TIRZEPATIDE 7.5 MG/.5ML
7.5 INJECTION, SOLUTION SUBCUTANEOUS
Qty: 6 ML | Refills: 0 | Status: SHIPPED | OUTPATIENT
Start: 2024-02-16 | End: 2024-05-16

## 2024-02-16 NOTE — TELEPHONE ENCOUNTER
RN called patient. Patient's date of birth and full name both confirmed.   RN informed of provider's message as detailed .   He verbalizes understanding of all information, and agreeable to plan to take 7.5mg once a week for now. He will contact pharmacy.   Med list updated with note for the 15mg dose, Mounjaro.

## 2024-02-16 NOTE — TELEPHONE ENCOUNTER
RN Called pharmacy. Patient's date of birth and full name both confirmed.   Spoke with Ga. He provided the information below.       Out of stock: Mounjaro doses: 10mg, 12.5mg, and 15mg     [facility] ---15mg also on back order.   Instead, would you like to send Mounjaro 7.5mg?   If so, please include note (see quote)     They have Mounjaro 7.5mg   Requesting: to write in the notes ----   \"Using 7.5mg temporarily, due to stock issue, until 12.5mg or 15mg are in stock\"   And requesting to not cancel the order for 15mg , so that it can be  used when it is back in stock.

## 2024-03-04 ENCOUNTER — TELEPHONE (OUTPATIENT)
Dept: FAMILY MEDICINE CLINIC | Facility: CLINIC | Age: 39
End: 2024-03-04

## 2024-03-05 NOTE — TELEPHONE ENCOUNTER
Message Delivered)   D E L I V E R I E S :  2024 06:36p           ANA        Delivered  ======================================================================  Paging    Message # [phone]         2024 07:23p   [YEU]  To:  From:  CONSOLE  Primary MD:  Phone#:  ----------------------------------------------------------------------  VARUN HARRY [phone]  [birth year] PCP [facility] CAUSING BRUISING AND MORE GAS, CONSTANTLY GOING TO BATHROOM, WATER, AND THROWING UP Paged at number :  PAGE: [phone] at : Mar-  19:23

## 2024-03-05 NOTE — TELEPHONE ENCOUNTER
Called with episodes of diarrhea.  Started taking imodium to help .  Now less diarrhea and feels a lot of bloat.  No fever.  No severe abdominal pain.  Recommend trial of pepto bismol.  Seekd medical attention in person if symptom relief not occurring or condition worsens.

## 2024-03-07 ENCOUNTER — TELEPHONE (OUTPATIENT)
Dept: FAMILY MEDICINE CLINIC | Facility: CLINIC | Age: 39
End: 2024-03-07

## 2024-03-07 NOTE — TELEPHONE ENCOUNTER
Patient calling ( identified name and  )  is applying for a new job and requires a DOT forms and medical card due to DM     States this has been discussed with [facility]     As appointment with [facility]     Future Appointments   Date Time Provider Department Center   3/25/2024  9:30 AM [facility]       Asking to review plan of care for the new job     Patient asking to speak directly with [facility]    Best call back number: Juancarlos  at [phone]

## 2024-03-10 NOTE — TELEPHONE ENCOUNTER
Late entry:   Spoke with patient ( & Name verified) on 24 at 16: 00 pm    Patient wants to medical clearance for his DOT. Advise patient that patient is not cleared at this time due to uncontrolled DM. Patient needs to change diets, takes medication as directed and f/u in office for lab work up. Patient verbalized understanding.

## 2024-03-11 NOTE — TELEPHONE ENCOUNTER
Action Requested: Summary for Provider     []  Critical Lab, Recommendations Needed  [] Need Additional Advice  []   FYI    []   Need Orders  [] Need Medications Sent to Pharmacy  []  Other     SUMMARY: Patient called stated having diarrhea since starting the Mounjaro        Future Appointments   Date Time Provider Department Center   3/13/2024 10:45 AM Nguyen, Minhxuyen, PA-C [facility] [place]   3/25/2024  9:30 AM Barron Mack MD [facility]         Reason for call: No chief complaint on file.  Onset: Data Unavailable

## 2024-03-13 ENCOUNTER — OFFICE VISIT (OUTPATIENT)
Dept: FAMILY MEDICINE CLINIC | Facility: CLINIC | Age: 39
End: 2024-03-13

## 2024-03-13 ENCOUNTER — LAB ENCOUNTER (OUTPATIENT)
Dept: LAB | Age: 39
End: 2024-03-13
Attending: PHYSICIAN ASSISTANT
Payer: COMMERCIAL

## 2024-03-13 ENCOUNTER — TELEPHONE (OUTPATIENT)
Dept: FAMILY MEDICINE CLINIC | Facility: CLINIC | Age: 39
End: 2024-03-13

## 2024-03-13 VITALS
DIASTOLIC BLOOD PRESSURE: 87 MMHG | HEIGHT: 73 IN | WEIGHT: 315 LBS | HEART RATE: 102 BPM | BODY MASS INDEX: 41.75 KG/M2 | SYSTOLIC BLOOD PRESSURE: 137 MMHG

## 2024-03-13 DIAGNOSIS — E78.2 MIXED HYPERLIPIDEMIA: ICD-10-CM

## 2024-03-13 DIAGNOSIS — E11.9 TYPE 2 DIABETES MELLITUS WITHOUT COMPLICATION, WITHOUT LONG-TERM CURRENT USE OF INSULIN (HCC): ICD-10-CM

## 2024-03-13 DIAGNOSIS — E11.9 TYPE 2 DIABETES MELLITUS WITHOUT COMPLICATION, WITHOUT LONG-TERM CURRENT USE OF INSULIN (HCC): Primary | ICD-10-CM

## 2024-03-13 LAB
ALBUMIN SERPL-MCNC: 4.2 G/DL (ref 3.2–4.8)
ALBUMIN/GLOB SERPL: 1.4 {RATIO} (ref 1–2)
ALP LIVER SERPL-CCNC: 84 U/L
ALT SERPL-CCNC: 45 U/L
ANION GAP SERPL CALC-SCNC: 8 MMOL/L (ref 0–18)
AST SERPL-CCNC: 22 U/L (ref ?–34)
BILIRUB SERPL-MCNC: 0.5 MG/DL (ref 0.3–1.2)
BUN BLD-MCNC: 17 MG/DL (ref 9–23)
BUN/CREAT SERPL: 17.2 (ref 10–20)
CALCIUM BLD-MCNC: 9.1 MG/DL (ref 8.7–10.4)
CARTRIDGE LOT#: ABNORMAL NUMERIC
CHLORIDE SERPL-SCNC: 108 MMOL/L (ref 98–112)
CHOLEST SERPL-MCNC: 151 MG/DL (ref ?–200)
CO2 SERPL-SCNC: 28 MMOL/L (ref 21–32)
CREAT BLD-MCNC: 0.99 MG/DL
CREAT UR-SCNC: 42.2 MG/DL
EGFRCR SERPLBLD CKD-EPI 2021: 101 ML/MIN/1.73M2 (ref 60–?)
FASTING PATIENT LIPID ANSWER: YES
FASTING STATUS PATIENT QL REPORTED: YES
GLOBULIN PLAS-MCNC: 3.1 G/DL (ref 2.8–4.4)
GLUCOSE BLD-MCNC: 137 MG/DL (ref 70–99)
HDLC SERPL-MCNC: 29 MG/DL (ref 40–59)
HEMOGLOBIN A1C: 8.7 % (ref 4.3–5.6)
LDLC SERPL CALC-MCNC: 104 MG/DL (ref ?–100)
MICROALBUMIN UR-MCNC: 0.4 MG/DL
MICROALBUMIN/CREAT 24H UR-RTO: 9.5 UG/MG (ref ?–30)
NONHDLC SERPL-MCNC: 122 MG/DL (ref ?–130)
OSMOLALITY SERPL CALC.SUM OF ELEC: 302 MOSM/KG (ref 275–295)
POTASSIUM SERPL-SCNC: 4 MMOL/L (ref 3.5–5.1)
PROT SERPL-MCNC: 7.3 G/DL (ref 5.7–8.2)
SODIUM SERPL-SCNC: 144 MMOL/L (ref 136–145)
TRIGL SERPL-MCNC: 96 MG/DL (ref 30–149)
VLDLC SERPL CALC-MCNC: 16 MG/DL (ref 0–30)

## 2024-03-13 PROCEDURE — 83036 HEMOGLOBIN GLYCOSYLATED A1C: CPT | Performed by: PHYSICIAN ASSISTANT

## 2024-03-13 PROCEDURE — 36415 COLL VENOUS BLD VENIPUNCTURE: CPT

## 2024-03-13 PROCEDURE — 3052F HG A1C>EQUAL 8.0%<EQUAL 9.0%: CPT | Performed by: PHYSICIAN ASSISTANT

## 2024-03-13 PROCEDURE — 80061 LIPID PANEL: CPT

## 2024-03-13 PROCEDURE — 3008F BODY MASS INDEX DOCD: CPT | Performed by: PHYSICIAN ASSISTANT

## 2024-03-13 PROCEDURE — 3079F DIAST BP 80-89 MM HG: CPT | Performed by: PHYSICIAN ASSISTANT

## 2024-03-13 PROCEDURE — 3075F SYST BP GE 130 - 139MM HG: CPT | Performed by: PHYSICIAN ASSISTANT

## 2024-03-13 PROCEDURE — 80053 COMPREHEN METABOLIC PANEL: CPT

## 2024-03-13 PROCEDURE — 3061F NEG MICROALBUMINURIA REV: CPT | Performed by: PHYSICIAN ASSISTANT

## 2024-03-13 PROCEDURE — 99213 OFFICE O/P EST LOW 20 MIN: CPT | Performed by: PHYSICIAN ASSISTANT

## 2024-03-13 RX ORDER — GLIPIZIDE 10 MG/1
10 TABLET, FILM COATED, EXTENDED RELEASE ORAL DAILY
Qty: 90 TABLET | Refills: 3 | Status: SHIPPED | OUTPATIENT
Start: 2024-03-13 | End: 2024-06-11

## 2024-03-13 RX ORDER — TIRZEPATIDE 12.5 MG/.5ML
12.5 INJECTION, SOLUTION SUBCUTANEOUS
Qty: 6 ML | Refills: 1 | Status: SHIPPED | OUTPATIENT
Start: 2024-03-13 | End: 2024-03-13

## 2024-03-13 RX ORDER — TIRZEPATIDE 7.5 MG/.5ML
7.5 INJECTION, SOLUTION SUBCUTANEOUS
Qty: 6 ML | Refills: 3 | Status: SHIPPED | OUTPATIENT
Start: 2024-03-13 | End: 2024-06-11

## 2024-03-13 RX ORDER — ATORVASTATIN CALCIUM 10 MG/1
10 TABLET, FILM COATED ORAL DAILY
Qty: 90 TABLET | Refills: 3 | Status: SHIPPED | OUTPATIENT
Start: 2024-03-13 | End: 2025-03-08

## 2024-03-13 NOTE — PROGRESS NOTES
HPI:     HPI  38 year-old male is here in the office for follow up. Patient is doing fine at this time. Patient states that patient felt nausea when patient injected Mounjaro 10 mg. Patient can tolerate Mounjaro 7.5 mg. Patient does not want insulin at this time.    Medications:     Current Outpatient Medications   Medication Sig Dispense Refill    atorvastatin (LIPITOR) 10 MG Oral Tab Take 1 tablet (10 mg total) by mouth daily. 90 tablet 3    Tirzepatide (MOUNJARO) 7.5 MG/0.5ML Subcutaneous Solution Pen-injector Inject 7.5 mg into the skin every 7 days. 6 mL 3    glipiZIDE ER 10 MG Oral Tablet 24 Hr Take 1 tablet (10 mg total) by mouth daily. 90 tablet 3    Blood Glucose Monitoring Suppl (ONETOUCH VERIO FLEX SYSTEM) w/Device Does not apply Kit Test blood sugar two times daily. 1 kit 0    Empagliflozin (JARDIANCE) 25 MG Oral Tab Take 1 tablet by mouth every morning. 90 tablet 3    benzonatate 200 MG Oral Cap Take 1 capsule (200 mg total) by mouth 3 (three) times daily as needed for cough. 30 capsule 0    amLODIPine 10 MG Oral Tab Take 1 tablet (10 mg total) by mouth daily. 90 tablet 3    hydroCHLOROthiazide 25 MG Oral Tab Take 1 tablet (25 mg total) by mouth daily. 90 tablet 3    losartan 100 MG Oral Tab Take 1 tablet (100 mg total) by mouth daily. 90 tablet 3    Omeprazole 40 MG Oral Capsule Delayed Release Take 1 capsule (40 mg total) by mouth daily. 90 capsule 3       Allergies:   No Known Allergies    History:     Health Maintenance   Topic Date Due    Diabetes Care Dilated Eye Exam  04/01/2020    COVID-19 Vaccine (3 - 2023-24 season) 09/01/2023    Annual Depression Screening  01/01/2024    Diabetes Care: Microalb/Creat Ratio  03/03/2024    HTN: BP Follow-Up  03/08/2024    Influenza Vaccine (1) 06/30/2024 (Originally 10/1/2023)    Diabetes Care A1C  05/14/2024    Diabetes Care Foot Exam  09/29/2024    Diabetes Care: GFR  09/29/2024    Annual Physical  09/29/2024    DTaP,Tdap,and Td Vaccines (2 - Td or Tdap)  06/01/2026    Pneumococcal Vaccine: Birth to 64yrs (3 of 3 - PPSV23 or PCV20) 10/15/2052       No LMP for male patient.   Past Medical History:     Past Medical History:   Diagnosis Date    Essential hypertension     Morbid obesity (HCC)     Recurrent kidney stones        Past Surgical History:   History reviewed. No pertinent surgical history.    Family History:     Family History   Problem Relation Age of Onset    Hypertension Father     Diabetes Father     Other (Kidney Stones) Father     Breast Cancer Mother     Hypertension Mother     Diabetes Maternal Grandmother     Prostate Cancer Maternal Grandfather        Social History:     Social History     Socioeconomic History    Marital status:      Spouse name: Not on file    Number of children: Not on file    Years of education: Not on file    Highest education level: Not on file   Occupational History    Occupation:    Tobacco Use    Smoking status: Never    Smokeless tobacco: Never   Vaping Use    Vaping Use: Never used   Substance and Sexual Activity    Alcohol use: Not Currently     Alcohol/week: 0.0 standard drinks of alcohol     Comment: 2-3 beers 1-2 days weekly    Drug use: No    Sexual activity: Not on file   Other Topics Concern    Not on file   Social History Narrative    Not on file     Social Determinants of Health     Financial Resource Strain: Not on file   Food Insecurity: Not on file   Transportation Needs: Not on file   Physical Activity: Not on file   Stress: Not on file   Social Connections: Not on file   Housing Stability: Not on file       Review of Systems:   Review of Systems   Constitutional:  Negative for activity change, chills, fatigue and fever.   HENT:  Negative for congestion, ear discharge, ear pain, postnasal drip, rhinorrhea, sinus pressure, sinus pain and sore throat.    Respiratory:  Negative for cough, chest tightness, shortness of breath and wheezing.    Cardiovascular:  Negative for chest pain and palpitations.    Gastrointestinal:  Negative for abdominal distention, abdominal pain, blood in stool, constipation, diarrhea, nausea and vomiting.   Skin:  Negative for rash.        Vitals:    03/13/24 1040   BP: 137/87   Pulse: 102   Weight: (!) 374 lb (169.6 kg)   Height: 6' 1\" (1.854 m)     Body mass index is 49.34 kg/m².    Physical Exam:   Physical Exam  Vitals reviewed.      Patient alert and orient x3, able to carry on conversation easily, without shortness of breath, coughing, can speak in full sentences.      Assessment and Plan::     Problem List Items Addressed This Visit          HCC Problems    Type 2 diabetes mellitus without complication, without long-term current use of insulin (HCC) - Primary    Relevant Medications        Tirzepatide (MOUNJARO) 7.5 MG/0.5ML Subcutaneous Solution Pen-injector    glipiZIDE ER 10 MG Oral Tablet 24 Hr    Other Relevant Orders    POC Glycohemoglobin [25932] (Completed)    Microalb/Creat Ratio, Random Urine    Lipid Panel (Completed)    Comp Metabolic Panel (14) (Completed)    Continue with Jardiance 25 mg QAM and Mounjaro 7.5 mg Qweek. Start Glipizide 10 mg QAM.         Cardiac and Vasculature    Mixed hyperlipidemia    Relevant Medications    atorvastatin (LIPITOR) 10 MG Oral Tab        Other Relevant Orders    Lipid Panel (Completed)    Comp Metabolic Panel (14) (Completed)    Start Lipitor 10 mg at bedtime. Recheck Lipid panel in 3 months.     Discussed plan of care with pt and pt is in agreement.All questions answered. Pt to call with questions or concerns.

## 2024-03-14 DIAGNOSIS — E78.89 LIPIDS ABNORMAL: Primary | ICD-10-CM

## 2024-03-25 ENCOUNTER — OFFICE VISIT (OUTPATIENT)
Dept: ENDOCRINOLOGY CLINIC | Facility: CLINIC | Age: 39
End: 2024-03-25
Payer: COMMERCIAL

## 2024-03-25 VITALS
HEART RATE: 92 BPM | BODY MASS INDEX: 41.75 KG/M2 | HEIGHT: 73 IN | DIASTOLIC BLOOD PRESSURE: 88 MMHG | SYSTOLIC BLOOD PRESSURE: 145 MMHG | WEIGHT: 315 LBS

## 2024-03-25 DIAGNOSIS — G47.33 OSA (OBSTRUCTIVE SLEEP APNEA): ICD-10-CM

## 2024-03-25 DIAGNOSIS — R73.09 HIGH GLUCOSE LEVEL: ICD-10-CM

## 2024-03-25 DIAGNOSIS — I15.2 OBESITY, DIABETES, AND HYPERTENSION SYNDROME (HCC): ICD-10-CM

## 2024-03-25 DIAGNOSIS — E11.65 HYPERGLYCEMIA DUE TO DIABETES MELLITUS (HCC): ICD-10-CM

## 2024-03-25 DIAGNOSIS — I10 HYPERTENSION GOAL BP (BLOOD PRESSURE) < 130/80: ICD-10-CM

## 2024-03-25 DIAGNOSIS — K21.9 GASTROESOPHAGEAL REFLUX DISEASE WITHOUT ESOPHAGITIS: ICD-10-CM

## 2024-03-25 DIAGNOSIS — R79.89 LOW VITAMIN D LEVEL: ICD-10-CM

## 2024-03-25 DIAGNOSIS — E11.59 OBESITY, DIABETES, AND HYPERTENSION SYNDROME (HCC): ICD-10-CM

## 2024-03-25 DIAGNOSIS — E11.65 UNCONTROLLED TYPE 2 DIABETES MELLITUS WITH HYPERGLYCEMIA (HCC): ICD-10-CM

## 2024-03-25 DIAGNOSIS — E11.69 OBESITY, DIABETES, AND HYPERTENSION SYNDROME (HCC): ICD-10-CM

## 2024-03-25 DIAGNOSIS — I10 PRIMARY HYPERTENSION: ICD-10-CM

## 2024-03-25 DIAGNOSIS — I10 ESSENTIAL HYPERTENSION: ICD-10-CM

## 2024-03-25 DIAGNOSIS — E78.2 MIXED HYPERLIPIDEMIA: ICD-10-CM

## 2024-03-25 DIAGNOSIS — E66.9 OBESITY, DIABETES, AND HYPERTENSION SYNDROME (HCC): ICD-10-CM

## 2024-03-25 DIAGNOSIS — E07.9 THYROID DISEASE: ICD-10-CM

## 2024-03-25 DIAGNOSIS — E11.9 TYPE 2 DIABETES MELLITUS WITHOUT COMPLICATION, WITHOUT LONG-TERM CURRENT USE OF INSULIN (HCC): Primary | ICD-10-CM

## 2024-03-25 DIAGNOSIS — E66.01 MORBID OBESITY (HCC): ICD-10-CM

## 2024-03-25 LAB
GLUCOSE BLOOD: 183
TEST STRIP EXPIRATION DATE: NORMAL DATE
TEST STRIP LOT #: NORMAL NUMERIC

## 2024-03-25 RX ORDER — ACYCLOVIR 400 MG/1
1 TABLET ORAL
Qty: 18 EACH | Refills: 0 | Status: SHIPPED | OUTPATIENT
Start: 2024-03-25 | End: 2024-09-21

## 2024-03-25 RX ORDER — PIOGLITAZONEHYDROCHLORIDE 30 MG/1
30 TABLET ORAL DAILY
Qty: 90 TABLET | Refills: 1 | Status: SHIPPED | OUTPATIENT
Start: 2024-03-25 | End: 2024-09-21

## 2024-03-25 NOTE — PROGRESS NOTES
New Patient Evaluation - History and Physical    CONSULT - Reason for Visit:  uncontrolled DM  Requesting Physician:   Brenton Solis PA-C  CHIEF COMPLAINT:    Chief Complaint   Patient presents with    Consult     Diabetes last A1c 8.7 03/13/24        HISTORY OF PRESENT ILLNESS:   Elvis Eldridge is a 38 year old male who presents with uncontrolled DM    DM HISTORY  Diagnosed: 2018  CURRENT DIABETIC MEDICATIONS INCLUDE:  Jardiance 25   Mounjaro 7.5 - had GI SE from 10 mg/week- started 6 mo ago, was on ozempic before.   Glipizide 10   Had SE from metformin       HISTORY OF DIABETES COMPLICATIONS: :  History of Retinopathy:  no  History of Neuropathy:  no  History of Nephropathy: no    ASSOCIATED COMPLICATIONS:   HTN:  CCB, hydrochlorothiazide, losartan  Hyperlipidemia: yes on   CAD/ASCVD/PAD/CVA:  no       POC at home every other day  BG AM was 159 - 200    MEALS:  5 small meals a day   Mainly water     EXERCISE:  Walks 2-3x/week    Lab Results   Component Value Date    A1C 8.7 (A) 03/13/2024    A1C 9.2 (H) 02/14/2024    A1C 8.3 (H) 09/29/2023    A1C 8.7 (H) 03/03/2023    A1C 8.7 (H) 12/30/2022        DM quality measures:  A1C/Blood pressure: as reported above   Last dilated eye exam: Last Dilated Eye Exam: 02/14/24     Exam shows retinopathy? No   Last diabetic foot exam: No data recorded 1/2024  Dentist : recommend every 6m  Nephropathy screening:   ace /arb rx:     LIPID screening: Cholesterol Meds: atorvastatin Tabs - 10 MG     Cholesterol: 151, done on 3/13/2024.  HDL Cholesterol: 29, done on 3/13/2024.  TriGlycerides 96, done on 3/13/2024.  LDL Cholesterol: 104, done on 3/13/2024.     CDL - does not want to be on insulin     ASSESSMENT AND PLAN:  Elvis Eldridge is a 38 year old male who presents with uncontrolled DM, HTN, DLP, obese BMI 51,     Plan   f/u in 3 mo - agreed target wt loss 10 lb and target A1c < 7%   Will check TSH levels now   Use CPAP every night   Take D3 2000 unit/day   Start  pioglitazone 30 mg/day  Jardiance 25 mg daily    Mounjaro 7.5 - had GI SE from 10 mg/ week - will rechallenge next visit   Glipizide 10 mg/day - ok to take 1/2 tablet if  getting low BG   Continue atorvastatin and losartan   Will give CGM samples and send Rx for dexcom. Might not be covered.     Will refer to CDE    If you have low blood sugar <70, take 15 grams of carb (8 oz juice or regular soda) and recheck in 15 minutes.    Follow up with podiatry and eye doctor annually.   Patients need to wear covered shoes all the time and check feet daily.   Bring your meter/BG log to the next visit.      Target A1c 6.5%  Target BG   BEFORE MEAL 100-130mg/dl  2hrs AFTER MEAL less than 180mg/dl    GLP-1 agonists:  No personal or family history of MEN syndrome   No personal history pancreatitis   Patient counselled regarding side effects including injection site reactions, nausea, vomiting, diarrhea, pancreatitis, gastroparesis and rare side effect sarai Scott syndrome.    SGLT2 inhibitors  No UTI or yeast infection   Discussed side effects including UTI and fungal infections.   Discussed the importance of hydration.      We discussed these in detail with the patient and negotiated which of numerous possible changes in the in the treatment plan that would be acceptable to them. The patient remains at ongoing is at high risk for complications related to uncontrolled diabetes and treatment.  The patient requires a great deal of self-management and support. We expect the patient's risk to be reduced with the changes to the treatment plan that we recommended today.   We reviewed a very large amount of complicated data including BG target range, basal insulin doses, meal and correction related insulin boluses, time of meal, size of meal, time of BG testing and insulin administration in relations to meals. We also reviewed self-testing BG results if available.     PAST MEDICAL HISTORY:   Past Medical History:   Diagnosis Date     Essential hypertension     Morbid obesity (HCC)     Recurrent kidney stones        PAST SURGICAL HISTORY:   History reviewed. No pertinent surgical history.    CURRENT MEDICATIONS:     Continuous Blood Gluc Sensor (DEXCOM G7 SENSOR) Does not apply Misc 1 each Every 10 days. Use as directed every 10 days 18 each 0    pioglitazone 30 MG Oral Tab Take 1 tablet (30 mg total) by mouth daily. 90 tablet 1    Continuous Blood Gluc Sensor (DEXCOM G7 SENSOR) Does not apply Misc 1 each Every 10 days. Use as directed every 10 days 18 each 0    atorvastatin (LIPITOR) 10 MG Oral Tab Take 1 tablet (10 mg total) by mouth daily. 90 tablet 3    Tirzepatide (MOUNJARO) 7.5 MG/0.5ML Subcutaneous Solution Pen-injector Inject 7.5 mg into the skin every 7 days. 6 mL 3    glipiZIDE ER 10 MG Oral Tablet 24 Hr Take 1 tablet (10 mg total) by mouth daily. 90 tablet 3    Blood Glucose Monitoring Suppl (ONETOUCH VERIO FLEX SYSTEM) w/Device Does not apply Kit Test blood sugar two times daily. 1 kit 0    Empagliflozin (JARDIANCE) 25 MG Oral Tab Take 1 tablet by mouth every morning. 90 tablet 3    benzonatate 200 MG Oral Cap Take 1 capsule (200 mg total) by mouth 3 (three) times daily as needed for cough. 30 capsule 0    amLODIPine 10 MG Oral Tab Take 1 tablet (10 mg total) by mouth daily. 90 tablet 3    hydroCHLOROthiazide 25 MG Oral Tab Take 1 tablet (25 mg total) by mouth daily. 90 tablet 3    losartan 100 MG Oral Tab Take 1 tablet (100 mg total) by mouth daily. 90 tablet 3    Omeprazole 40 MG Oral Capsule Delayed Release Take 1 capsule (40 mg total) by mouth daily. 90 capsule 3       ALLERGIES:  No Known Allergies    SOCIAL HISTORY:    Social History     Socioeconomic History    Marital status:    Occupational History    Occupation:    Tobacco Use    Smoking status: Never    Smokeless tobacco: Never   Vaping Use    Vaping Use: Never used   Substance and Sexual Activity    Alcohol use: Not Currently     Alcohol/week: 0.0 standard  drinks of alcohol     Comment: 2-3 beers 1-2 days weekly    Drug use: No       FAMILY HISTORY:   Family History   Problem Relation Age of Onset    Hypertension Father     Diabetes Father     Other (Kidney Stones) Father     Breast Cancer Mother     Hypertension Mother     Diabetes Maternal Grandmother     Prostate Cancer Maternal Grandfather         REVIEW OF SYSTEMS:  All negative other than HPI    PHYSICAL EXAM:   Height: 6' 1\" (185.4 cm) (03/25 0918)  Weight: 391 lb (177.4 kg) (03/25 0918)  BSA (Calculated - sq m): 2.86 sq meters (03/25 0918)  Pulse: 92 (03/25 0918)  BP: 145/88 (03/25 0918)  Temp: --  Do Not Use - Resp Rate: --  SpO2: --    Body mass index is 51.59 kg/m².  Obese, no striae   CONSTITUTIONAL:  Awake and alert. Age appropriate, good hygiene not in acute distress. Well-nourished and well developed. no acute distress   PSYCH:   Orientated to time, place, person & situation, Normal mood and affect, memory intact, normal insight and judgment, cooperative  Neuro: speech is clear. Awake, alert, no aphasia, no facial asymmetry, no nuchal rigidity  EYES:  No proptosis, no ptosis, conjunctiva normal  ENT:  Normocephalic, atraumatic  Eye: EOMI, normal lids, no discharge, no conjunctival erythema. No exophthalmos/proptosis, Ptosis negative   No rhinorrhea, moist oral mucosa  Neck: full range of motion  Neck/Thyroid: neck inspection: normal, No scar, No goiter   LUNGS:  No acute respiratory distress, non-labored respiration. Speaking full sentences  CARDIOVASCULAR:  regular rate   ABDOMEN:  No abdominal tenderness.   SKIN:  no bruising or bleeding, no rashes and no lesions, Skin is dry, no obvious rashes or lesions  EXTREMITIES: no gross abnormality   MSK: Moves extremities spontaneously. full range of motion in all major joints      DATA:     Pertinent data reviewed      Latest Reference Range & Units 03/13/24 11:34   CREATININE UR RANDOM mg/dL 42.20   MALB URINE mg/dL 0.40   MALB/CRE CALC <=30.0 ug/mg 9.5      XR ABD Limited exam demonstrating no definite calcifications.      No results for input(s): \"TSH\", \"T4F\", \"T3F\", \"THYP\" in the last 72 hours.  No results found.  POC HemoCue Glucose 201 (Finger stick glucose)        Component Value Flag Ref Range Units Status    GLUCOSE BLOOD 183        Final    Test Strip Lot # [id number]       Numeric Final    Test Strip Expiration Date 92,324       Date Final                  Orders Placed This Encounter   Procedures    TSH W Reflex To Free T4    POC HemoCue Glucose 201 (Finger stick glucose)    TSH and Free T4     Orders Placed This Encounter    TSH W Reflex To Free T4     Standing Status:   Future     Standing Expiration Date:   3/25/2025     Order Specific Question:   Release to patient     Answer:   Immediate    POC HemoCue Glucose 201 (Finger stick glucose)     Order Specific Question:   Release to patient     Answer:   Immediate    TSH and Free T4     Order Specific Question:   Release to patient     Answer:   Immediate    Continuous Blood Gluc Sensor (DEXCOM G7 SENSOR) Does not apply Misc     Si each Every 10 days. Use as directed every 10 days     Dispense:  18 each     Refill:  0    pioglitazone 30 MG Oral Tab     Sig: Take 1 tablet (30 mg total) by mouth daily.     Dispense:  90 tablet     Refill:  1    Continuous Blood Gluc Sensor (DEXCOM G7 SENSOR) Does not apply Misc     Si each Every 10 days. Use as directed every 10 days     Dispense:  18 each     Refill:  0          This is a specialized patient consultation in endocrinology and required comprehensive review of prior records, as well as current evaluation, with time required for consideration of complex endocrine issues and consultation. For this visit, I personally interviewed the patient, and family member if accompanied, performed the pertinent parts of the history and physical examination. ROS included screening for appropriate endocrine conditions.   Today's diagnosis and plan were reviewed in  detail with the patient who states understanding and agrees with plan. I discussed with the patient possible diagnosis, differential diagnosis, need for work up, treatment options, alternatives and side effects.     Please see note for details about time spent which includes:   · pre-visit preparation  · reviewing records  · face to face time with the patient   · timely documentation of the encounter  · ordering medications/tests  · communication with care team  · care coordination    I appreciate the opportunity to be part of your patient's medical care and will keep you, as the referring and primary physicians, informed about the care of your patient. Please feel free to contact me should you have any questions.      Barron Mack MD

## 2024-03-25 NOTE — PATIENT INSTRUCTIONS
f/u in 3 mo - agreed target wt loss 10 lb and target A1c < 7%   Will check TSH levels now   Use CPAP every night   Take D3 2000 unit/day   Start pioglitazone 30 mg/day  Jardiance 25 mg daily    Mounjaro 7.5 - had GI SE from 10 mg/ week - will rechallenge next visit   Glipizide 10 mg/day - ok to take 1/2 tablet if  getting low BG   Continue atorvastatin and losartan   Will give CGM samples and send Rx for dexcom. Might not be covered.     Will refer to CDE    If you have low blood sugar <70, take 15 grams of carb (8 oz juice or regular soda) and recheck in 15 minutes.    Follow up with podiatry and eye doctor annually.   Patients need to wear covered shoes all the time and check feet daily.   Bring your meter/BG log to the next visit.      Target A1c 6.5%  Target BG   BEFORE MEAL 100-130mg/dl  2hrs AFTER MEAL less than 180mg/dl

## 2024-03-29 ENCOUNTER — TELEPHONE (OUTPATIENT)
Dept: ENDOCRINOLOGY CLINIC | Facility: CLINIC | Age: 39
End: 2024-03-29

## 2024-03-29 NOTE — TELEPHONE ENCOUNTER
Current Outpatient Medications   Medication Sig Dispense Refill    Continuous Blood Gluc Sensor (DEXCOM G7 SENSOR) Does not apply Misc 1 each Every 10 days. Use as directed every 10 days 18 each 0     Key # [id number]

## 2024-04-01 NOTE — TELEPHONE ENCOUNTER
Medication PA Requested:  DEXCOM G7 SENSOR                                                         CoverMyMeds Used:  Key: [id number]   Quantity: 9  Day Supply: 90  Sig: use 1 every 10 days  DX Code:       E11.9

## 2024-04-05 ENCOUNTER — TELEPHONE (OUTPATIENT)
Dept: ENDOCRINOLOGY CLINIC | Facility: CLINIC | Age: 39
End: 2024-04-05

## 2024-04-05 NOTE — TELEPHONE ENCOUNTER
Needs Prior Authorization-Pharmacy called wrong office Endocrinology writes this prescription for patient.

## 2024-04-05 NOTE — TELEPHONE ENCOUNTER
Current Outpatient Medications:     Continuous Blood Gluc Sensor (DEXCOM G7 SENSOR) Does not apply Misc, 1 each Every 10 days. Use as directed every 10 days, Disp: 18 each, Rfl:

## 2024-04-09 NOTE — TELEPHONE ENCOUNTER
Called [facility] at [phone], spoke with Deshawn, states patient picked up Dexcom G7 Sensor on 3/28/24. Per Deshawn, no PA was needed.

## 2024-06-25 DIAGNOSIS — E11.9 TYPE 2 DIABETES MELLITUS WITHOUT COMPLICATION, WITHOUT LONG-TERM CURRENT USE OF INSULIN (HCC): ICD-10-CM

## 2024-06-28 RX ORDER — TIRZEPATIDE 7.5 MG/.5ML
7.5 INJECTION, SOLUTION SUBCUTANEOUS
Qty: 6 ML | Refills: 1 | Status: SHIPPED | OUTPATIENT
Start: 2024-06-28 | End: 2024-09-26

## 2024-06-28 NOTE — TELEPHONE ENCOUNTER
Please review. Protocol Failed; No Protocol    Requested Prescriptions   Pending Prescriptions Disp Refills    MOUNJARO 7.5 MG/0.5ML Subcutaneous Solution Pen-injector [Pharmacy Med Name: Mounjaro Subcutaneous Solution Pen-injector 7.5 MG/0.5ML 7.5 MG/0.5ML  Solution Pen-injector]  0     Sig: INJECT 7.5 MG INTO THE SKIN EVERY SEVEN DAYS.       Diabetes Medication Protocol Failed - 6/25/2024 11:49 AM        Failed - Last A1C < 7.5 and within past 6 months     Lab Results   Component Value Date    A1C 8.7 (A) 03/13/2024             Passed - In person appointment or virtual visit in the past 6 mos or appointment in next 3 mos     Recent Outpatient Visits              3 months ago Type 2 diabetes mellitus without complication, without long-term current use of insulin ([facility])    [facility] Barron Mack MD    Office Visit    3 months ago Type 2 diabetes mellitus without complication, without long-term current use of insulin ([facility])    [facility], [address], [place] Theron Solis PA-C    Office Visit    4 months ago BARBARA (obstructive sleep apnea)    [facility] Beverly Uribe DO    Office Visit    6 months ago [facility]    Office Visit    9 months ago Routine general medical examination at a health care facility    [facility], [address], [place] Theron Solis PA-C    Office Visit                      Passed - Microalbumin procedure in past 12 months or taking ACE/ARB        Passed - EGFRCR or GFRNAA > 50     GFR Evaluation  EGFRCR: 101 , resulted on 3/13/2024          Passed - GFR in the past 12 months                 Recent Outpatient Visits              3 months ago Type 2 diabetes mellitus without complication, without long-term current use of insulin ([facility])    [facility],  [facility], [place] Barron Mack MD    Office Visit    3 months ago Type 2 diabetes mellitus without complication, without long-term current use of insulin [facility]    [facility] Visit    4 months ago BARBARA (obstructive sleep apnea)    [facility], [facility], [place] Beverly Uribe DO    Office Visit    6 months ago [facility]    [facility] Visit    9 months ago Routine general medical examination at a health care facility    [facility]    [facility] Visit

## 2024-08-02 ENCOUNTER — HOSPITAL ENCOUNTER (OUTPATIENT)
Age: 39
Discharge: HOME | End: 2024-08-02
Payer: COMMERCIAL

## 2024-08-02 ENCOUNTER — APPOINTMENT (OUTPATIENT)
Dept: GENERAL RADIOLOGY | Age: 39
End: 2024-08-02
Payer: COMMERCIAL

## 2024-08-02 VITALS
SYSTOLIC BLOOD PRESSURE: 148 MMHG | TEMPERATURE: 97 F | HEART RATE: 83 BPM | OXYGEN SATURATION: 96 % | RESPIRATION RATE: 16 BRPM | DIASTOLIC BLOOD PRESSURE: 99 MMHG

## 2024-08-02 DIAGNOSIS — M79.675 GREAT TOE PAIN, LEFT: Primary | ICD-10-CM

## 2024-08-02 PROCEDURE — 99213 OFFICE O/P EST LOW 20 MIN: CPT

## 2024-08-02 PROCEDURE — 73630 X-RAY EXAM OF FOOT: CPT

## 2024-08-02 PROCEDURE — 99204 OFFICE O/P NEW MOD 45 MIN: CPT

## 2024-08-02 RX ORDER — PREDNISONE 20 MG/1
40 TABLET ORAL DAILY
Qty: 10 TABLET | Refills: 0 | Status: SHIPPED | OUTPATIENT
Start: 2024-08-02 | End: 2024-08-07

## 2024-08-02 RX ORDER — TRAMADOL HYDROCHLORIDE 50 MG/1
50 TABLET ORAL EVERY 8 HOURS PRN
Qty: 10 TABLET | Refills: 0 | Status: SHIPPED | OUTPATIENT
Start: 2024-08-02

## 2024-08-02 NOTE — ED INITIAL ASSESSMENT (HPI)
Patient reports pain to the base of his left great toe. Denies trauma, injury. Denies hx of gout.

## 2024-08-02 NOTE — DISCHARGE INSTRUCTIONS
Follow-up with your primary care provider for additional pain management options follow-up with the podiatrist for further evaluation of toe pain if it continues or persist.  Take the prednisone as prescribed early in the morning with food on the stomach as this medication can cause you to stay awake upset stomach and can cause your blood sugars to be elevated so make sure you are checking your blood sugars every day if your blood sugar becomes 250 or above stop the prednisone as discussed.  You may take the tramadol this is a narcotic that may cause drowsiness and constipation no driving or operating machinery while taking this medication and make sure you are eating and drinking well.  You may continue over-the-counter Advil and icing and elevating the extremity if all of a sudden you have redness increase in swelling pus or drainage from the skin fevers or chills or any other symptoms or any new or worsening symptoms go to the nearest emergency department.

## 2024-08-02 NOTE — ED PROVIDER NOTES
Patient Seen in: [facility] [place]      History     Chief Complaint   Patient presents with    Toe Pain     Stated Complaint: swollen toe    Subjective:   HPI    This is a 38-year-old male with history of hypertension obesity diabetes presenting with left great toe pain.  Patient states that he has had left great toe pain for about a week denies any injury or trauma does wear steel toe boots for work.  Denies a history of gout.  Denies difficulty ambulating has been taking Advil for the pain.    Objective:   Past Medical History:    Essential hypertension    Morbid obesity (HCC)    Recurrent kidney stones              History reviewed. No pertinent surgical history.             No pertinent social history.            Review of Systems    Positive for stated Chief Complaint: Toe Pain    Other systems are as noted in HPI.  Constitutional and vital signs reviewed.      All other systems reviewed and negative except as noted above.    Physical Exam     ED Triage Vitals [08/02/24 1229]   BP (!) 148/99   Pulse 83   Resp 16   Temp 97.3 °F (36.3 °C)   Temp src Temporal   SpO2 96 %   O2 Device None (Room air)       Current Vitals:   Vital Signs  BP: (!) 148/99  Pulse: 83  Resp: 16  Temp: 97.3 °F (36.3 °C)  Temp src: Temporal    Oxygen Therapy  SpO2: 96 %  O2 Device: None (Room air)            Physical Exam  Vitals and nursing note reviewed.   Constitutional:       Appearance: Normal appearance.   HENT:      Right Ear: External ear normal.      Left Ear: External ear normal.      Nose: Nose normal.      Mouth/Throat:      Mouth: Mucous membranes are moist.      Pharynx: Oropharynx is clear.   Eyes:      Conjunctiva/sclera: Conjunctivae normal.   Musculoskeletal:         General: Normal range of motion.      Cervical back: Normal range of motion.        Feet:    Skin:     General: Skin is warm.      Capillary Refill: Capillary refill takes less than 2 seconds.   Neurological:      General: No focal deficit present.       Mental Status: He is alert and oriented to person, place, and time.               ED Course   Labs Reviewed - No data to display          XR FOOT, COMPLETE (MIN 3 VIEWS), LEFT (CPT=73630)    Result Date: 8/2/2024  CONCLUSION:   No acute fracture or dislocation.  Mild soft tissue swelling adjacent to the 1st metatarsophalangeal joint.     Dictated by (CST): Felipe Rubi MD on 8/02/2024 at 12:46 PM     Finalized by (CST): Felipe Rubi MD on 8/02/2024 at 12:48 PM                  MDM                 Medical Decision Making  38-year-old male blood pressure elevated asymptomatic with elevated blood pressure reading presenting with left great toe pain.  DDx gout versus toe sprain versus fracture versus contusion versus cellulitis.  Physical exam is not consistent with a cellulitis suspected gout x-ray will be ordered patient is agreeable with this plan of care.    X-ray of the toe independently viewed by this provider no fracture noted discussed with the patient suspect that he has gout and discussed x-ray result.  Discussed outpatient follow-up with podiatry we discussed prednisone and the side effects of this medication as he is a diabetic we will need to monitor his carefully.  Discussed postop shoe patient declined.  Discussed tramadol will be prescribed for breakthrough pain may continue Tylenol and Advil and RICE therapy.  All education instructions discussed with the patient placed in discharge at work.  Patient feels comfortable with the plan of care and acknowledges understanding discharge instructions.    Problems Addressed:  Great toe pain, left: acute illness or injury    Amount and/or Complexity of Data Reviewed  Radiology: ordered and independent interpretation performed. Decision-making details documented in ED Course.    Risk  OTC drugs.  Prescription drug management.        Disposition and Plan     Clinical Impression:  1. Great toe pain, left         Disposition:  Discharge  8/2/2024  1:02  pm    Follow-up:  Kristin Moss, DPNELSON  [address] [facility], [facility] [address]  [place] [id number] [phone]    Call   Podiatrist to follow-up with    Chilo Messer DO  [address] [facility] [address]  [place] [address]  [phone]    In 1 week            Medications Prescribed:  Discharge Medication List as of 8/2/2024  1:03 PM        START taking these medications    Details   predniSONE 20 MG Oral Tab Take 2 tablets (40 mg total) by mouth daily for 5 days., Normal, Disp-10 tablet, R-0      traMADol 50 MG Oral Tab Take 1 tablet (50 mg total) by mouth every 8 (eight) hours as needed for Pain (May cause drowsiness no driving or operating machinery while taking this medication)., Normal, Disp-10 tablet, R-0

## 2024-08-30 NOTE — PROGRESS NOTES
HPI:   Anne-Marie Sanabria. is a 37year old male who presents for an Annual Health Visit. Patient is doing fine at this time. Patient denies of chest pain, SOB, N/V/C/D, fever, dizziness, syncope, abdominal pain. There are no other concerns today. Grandmother    • Prostate Cancer Maternal Grandfather       SOCIAL HISTORY   Social History    Tobacco Use      Smoking status: Never Smoker      Smokeless tobacco: Never Used    Vaping Use      Vaping Use: Never used    Alcohol use: Not Currently      Alc Nose: Nose normal.      Mouth/Throat:      Mouth: Mucous membranes are moist.      Pharynx: Oropharynx is clear. Eyes:      General:         Right eye: No discharge. Left eye: No discharge.       Extraocular Movements: Extraocular movements intact Tab; Take 1 tablet (100 mg total) by mouth daily. Mixed hyperlipidemia  -     LIPID PANEL; Future    Type 2 diabetes mellitus without complication, without long-term current use of insulin (HCC)  -     CBC WITH DIFFERENTIAL WITH PLATELET;  Future  - Screening Who needs it How often   Alcohol misuse All men in this age group At routine exams   Blood pressure All men in this age group Yearly checkup if your blood pressure is normal  Normal blood pressure is less than 120/80  If your blood pressure is hi the second dose should be given 1 to 2 months after the first dose and the third dose given 6 months after the first dose    Influenza (flu) All men in this age group Once a year   Measles, mumps, rubella (MMR) All men in this age group who have no record Morbid obesity ([facility] [address]     Obesity, diabetes, and hypertension syndrome ([facility] [address]     Type 2 diabetes mellitus without complication, without long-term current use of insulin ([address]     Essential hypertension     Mixed hyperlipidemia      Sonali Armenta PA-C [id number]

## 2024-09-07 ENCOUNTER — OFFICE VISIT (OUTPATIENT)
Dept: FAMILY MEDICINE CLINIC | Facility: CLINIC | Age: 39
End: 2024-09-07

## 2024-09-07 VITALS
WEIGHT: 315 LBS | HEIGHT: 73 IN | BODY MASS INDEX: 41.75 KG/M2 | DIASTOLIC BLOOD PRESSURE: 92 MMHG | HEART RATE: 98 BPM | SYSTOLIC BLOOD PRESSURE: 132 MMHG

## 2024-09-07 DIAGNOSIS — R09.81 NASAL CONGESTION: ICD-10-CM

## 2024-09-07 DIAGNOSIS — E66.01 CLASS 3 SEVERE OBESITY DUE TO EXCESS CALORIES WITHOUT SERIOUS COMORBIDITY WITH BODY MASS INDEX (BMI) OF 50.0 TO 59.9 IN ADULT (HCC): ICD-10-CM

## 2024-09-07 DIAGNOSIS — E11.9 TYPE 2 DIABETES MELLITUS WITHOUT COMPLICATION, WITHOUT LONG-TERM CURRENT USE OF INSULIN (HCC): ICD-10-CM

## 2024-09-07 DIAGNOSIS — J01.00 ACUTE NON-RECURRENT MAXILLARY SINUSITIS: Primary | ICD-10-CM

## 2024-09-07 PROBLEM — E66.813 CLASS 3 SEVERE OBESITY DUE TO EXCESS CALORIES WITHOUT SERIOUS COMORBIDITY WITH BODY MASS INDEX (BMI) OF 50.0 TO 59.9 IN ADULT (HCC): Status: ACTIVE | Noted: 2024-09-07

## 2024-09-07 PROBLEM — E66.813 CLASS 3 SEVERE OBESITY DUE TO EXCESS CALORIES WITHOUT SERIOUS COMORBIDITY WITH BODY MASS INDEX (BMI) OF 50.0 TO 59.9 IN ADULT: Status: ACTIVE | Noted: 2024-09-07

## 2024-09-07 PROCEDURE — 3075F SYST BP GE 130 - 139MM HG: CPT | Performed by: PHYSICIAN ASSISTANT

## 2024-09-07 PROCEDURE — 3080F DIAST BP >= 90 MM HG: CPT | Performed by: PHYSICIAN ASSISTANT

## 2024-09-07 PROCEDURE — 3008F BODY MASS INDEX DOCD: CPT | Performed by: PHYSICIAN ASSISTANT

## 2024-09-07 PROCEDURE — 99213 OFFICE O/P EST LOW 20 MIN: CPT | Performed by: PHYSICIAN ASSISTANT

## 2024-09-07 RX ORDER — PREDNISONE 20 MG/1
20 TABLET ORAL DAILY
Qty: 7 TABLET | Refills: 0 | Status: SHIPPED | OUTPATIENT
Start: 2024-09-07 | End: 2024-09-14

## 2024-09-07 NOTE — PROGRESS NOTES
HPI:     Sinus Problem  This is a new problem. Episode onset: 5 days. The problem has been gradually worsening since onset. There has been no fever. Associated symptoms include congestion, coughing, sinus pressure and a sore throat. Pertinent negatives include no chills, ear pain, headaches or shortness of breath.       Medications:     Current Outpatient Medications   Medication Sig Dispense Refill    predniSONE 20 MG Oral Tab Take 1 tablet (20 mg total) by mouth daily for 7 days. 7 tablet 0    amoxicillin clavulanate 875-125 MG Oral Tab Take 1 tablet by mouth 2 (two) times daily for 10 days. 20 tablet 0    traMADol 50 MG Oral Tab Take 1 tablet (50 mg total) by mouth every 8 (eight) hours as needed for Pain (May cause drowsiness no driving or operating machinery while taking this medication). 10 tablet 0    Tirzepatide (MOUNJARO) 7.5 MG/0.5ML Subcutaneous Solution Pen-injector Inject 7.5 mg into the skin every 7 days. 6 mL 1    Continuous Blood Gluc Sensor (DEXCOM G7 SENSOR) Does not apply Misc 1 each Every 10 days. Use as directed every 10 days 18 each 0    pioglitazone 30 MG Oral Tab Take 1 tablet (30 mg total) by mouth daily. 90 tablet 1    Continuous Blood Gluc Sensor (DEXCOM G7 SENSOR) Does not apply Misc 1 each Every 10 days. Use as directed every 10 days 18 each 0    atorvastatin (LIPITOR) 10 MG Oral Tab Take 1 tablet (10 mg total) by mouth daily. 90 tablet 3    Blood Glucose Monitoring Suppl (ONETOUCH VERIO FLEX SYSTEM) w/Device Does not apply Kit Test blood sugar two times daily. 1 kit 0    Empagliflozin (JARDIANCE) 25 MG Oral Tab Take 1 tablet by mouth every morning. 90 tablet 3    benzonatate 200 MG Oral Cap Take 1 capsule (200 mg total) by mouth 3 (three) times daily as needed for cough. 30 capsule 0    amLODIPine 10 MG Oral Tab Take 1 tablet (10 mg total) by mouth daily. 90 tablet 3    hydroCHLOROthiazide 25 MG Oral Tab Take 1 tablet (25 mg total) by mouth daily. 90 tablet 3    losartan 100 MG Oral Tab  Take 1 tablet (100 mg total) by mouth daily. 90 tablet 3    Omeprazole 40 MG Oral Capsule Delayed Release Take 1 capsule (40 mg total) by mouth daily. 90 capsule 3       Allergies:   No Known Allergies    History:     Health Maintenance   Topic Date Due    Annual Depression Screening  01/01/2024    HTN: BP Follow-Up  04/25/2024    Diabetes Care A1C  06/13/2024    COVID-19 Vaccine (3 - 2023-24 season) 09/01/2024    Diabetes Care Foot Exam  09/29/2024    Annual Physical  09/29/2024    Influenza Vaccine (1) 10/01/2024    Diabetes Care Dilated Eye Exam  02/14/2025    Diabetes Care: GFR  03/13/2025    Diabetes Care: Microalb/Creat Ratio  03/13/2025    DTaP,Tdap,and Td Vaccines (2 - Td or Tdap) 06/01/2026    Pneumococcal Vaccine: Birth to 64yrs (3 of 3 - PPSV23 or PCV20) 10/15/2052       No LMP for male patient.   Past Medical History:     Past Medical History:    Essential hypertension    Morbid obesity (HCC)    Recurrent kidney stones       Past Surgical History:   History reviewed. No pertinent surgical history.    Family History:     Family History   Problem Relation Age of Onset    Hypertension Father     Diabetes Father     Other (Kidney Stones) Father     Breast Cancer Mother     Hypertension Mother     Diabetes Maternal Grandmother     Prostate Cancer Maternal Grandfather        Social History:     Social History     Socioeconomic History    Marital status:      Spouse name: Not on file    Number of children: Not on file    Years of education: Not on file    Highest education level: Not on file   Occupational History    Occupation:    Tobacco Use    Smoking status: Never    Smokeless tobacco: Never   Vaping Use    Vaping status: Never Used   Substance and Sexual Activity    Alcohol use: Not Currently     Alcohol/week: 0.0 standard drinks of alcohol     Comment: 2-3 beers 1-2 days weekly    Drug use: No    Sexual activity: Not on file   Other Topics Concern    Not on file   Social History Narrative     Not on file     Social Determinants of Health     Financial Resource Strain: Not on file   Food Insecurity: Not on file   Transportation Needs: Not on file   Physical Activity: Not on file   Stress: Not on file   Social Connections: Unknown (3/18/2021)    [facility]    Social Connections     Conversations with friends/family/neighbors per week: Not on file   Housing Stability: Low Risk  (7/7/2021)    [facility]    Housing Stability     Mortgage Payment Concerns?: Not on file     Number of Places Lived in the Last Year: Not on file     Unstable Housing?: Not on file       Review of Systems:   Review of Systems   Constitutional:  Negative for activity change, chills, fatigue and fever.   HENT:  Positive for congestion, rhinorrhea, sinus pressure, sinus pain and sore throat. Negative for ear discharge, ear pain and postnasal drip.    Respiratory:  Positive for cough. Negative for chest tightness, shortness of breath and wheezing.    Cardiovascular:  Negative for chest pain and palpitations.   Gastrointestinal:  Negative for abdominal distention, abdominal pain, blood in stool, constipation, diarrhea, nausea and vomiting.   Skin:  Negative for rash.   Neurological:  Negative for headaches.        Vitals:    09/07/24 1019 09/07/24 1034   BP: (!) 147/92 (!) 132/92   Pulse: 98    Weight: (!) 402 lb (182.3 kg)    Height: 6' 1\" (1.854 m)      Body mass index is 53.04 kg/m².    Physical Exam:   Physical Exam  Vitals reviewed.   Constitutional:       General: He is not in acute distress.     Appearance: He is well-developed.   HENT:      Head: Normocephalic and atraumatic.      Right Ear: Tympanic membrane, ear canal and external ear normal. There is no impacted cerumen.      Left Ear: Tympanic membrane, ear canal and external ear normal. There is no impacted cerumen.      Nose: Congestion  present.      Right Sinus: Maxillary sinus tenderness present. No frontal sinus tenderness.      Left Sinus: Maxillary sinus tenderness present. No frontal sinus tenderness.      Mouth/Throat:      Mouth: Mucous membranes are moist.      Pharynx: Oropharynx is clear. No oropharyngeal exudate or posterior oropharyngeal erythema.   Eyes:      General:         Right eye: No discharge.         Left eye: No discharge.      Conjunctiva/sclera: Conjunctivae normal.   Cardiovascular:      Rate and Rhythm: Normal rate and regular rhythm.      Heart sounds: Normal heart sounds, S1 normal and S2 normal. No murmur heard.  Pulmonary:      Effort: Pulmonary effort is normal.      Breath sounds: Normal breath sounds. No wheezing or rales.   Chest:      Chest wall: No tenderness.   Lymphadenopathy:      Cervical: No cervical adenopathy.   Skin:     Findings: No rash.   Neurological:      Mental Status: He is alert and oriented to person, place, and time.   Psychiatric:         Behavior: Behavior is cooperative.          Assessment and Plan::     Problem List Items Addressed This Visit          [facility] Problems    Type 2 diabetes mellitus without complication, without long-term current use of insulin ([facility])    Class 3 severe obesity due to excess calories without serious comorbidity with body mass index (BMI) of 50.0 to 59.9 in adult ([facility])    Relevant Medications        Other Relevant Orders    [facility] [place] [facility]     Other Visit Diagnoses       Acute non-recurrent maxillary sinusitis    -  Primary    Relevant Medications    amoxicillin clavulanate 875-125 MG Oral Tab    Nasal congestion        Relevant Medications    predniSONE 20 MG Oral Tab          Discussed plan of care with pt and pt is in agreement.All questions answered. Pt to call with questions or concerns.

## 2024-09-25 DIAGNOSIS — E11.9 TYPE 2 DIABETES MELLITUS WITHOUT COMPLICATION, WITHOUT LONG-TERM CURRENT USE OF INSULIN (HCC): ICD-10-CM

## 2024-09-25 NOTE — TELEPHONE ENCOUNTER
Tirzepatide (MOUNJARO) 7.5 MG/0.5ML Subcutaneous Solution Pen-injector Inject 7.5 mg into the skin every 7 days. 6 mL 1

## 2024-10-01 RX ORDER — TIRZEPATIDE 7.5 MG/.5ML
7.5 INJECTION, SOLUTION SUBCUTANEOUS
Qty: 6 ML | Refills: 1 | Status: SHIPPED | OUTPATIENT
Start: 2024-10-01 | End: 2024-12-30

## 2024-10-01 NOTE — TELEPHONE ENCOUNTER
Please review. Protocol Failed; No Protocol    Requested Prescriptions   Pending Prescriptions Disp Refills    Tirzepatide (MOUNJARO) 7.5 MG/0.5ML Subcutaneous Solution Pen-injector 6 mL 1     Sig: Inject 7.5 mg into the skin every 7 days.       Diabetes Medication Protocol Failed - 9/25/2024  1:29 PM        Failed - Last A1C < 7.5 and within past 6 months     Lab Results   Component Value Date    A1C 8.7 (A) 03/13/2024             Passed - In person appointment or virtual visit in the past 6 mos or appointment in next 3 mos     Recent Outpatient Visits              3 weeks ago Acute non-recurrent maxillary sinusitis    [facility], [address], [place] Theron Solis PA-C    Office Visit    6 months ago Type 2 diabetes mellitus without complication, without long-term current use of insulin ([facility])    [facility] Barron Mack MD    Office Visit    6 months ago Type 2 diabetes mellitus without complication, without long-term current use of insulin ([facility])    [facility], [address], [place] Theron Solis PA-C    Office Visit    7 months ago BARBARA (obstructive sleep apnea)    [facility], [place] Beverly Uribe DO    Office Visit    9 months ago Acute cough    [facility], [address], [place] Theron Solis, PA-C    Office Visit                      Passed - Microalbumin procedure in past 12 months or taking ACE/ARB        Passed - EGFRCR or GFRNAA > 50     GFR Evaluation  EGFRCR: 101 , resulted on 3/13/2024          Passed - GFR in the past 12 months                 Recent Outpatient Visits              3 weeks ago Acute non-recurrent maxillary sinusitis    [facility], [address], [place] Theron Solis PA-C    Office Visit    6 months ago Type 2 diabetes mellitus without complication, without long-term current use of insulin ([facility])    [place]  [facility] Barron Mack MD    Office Visit    6 months ago Type 2 diabetes mellitus without complication, without long-term current use of insulin (HCC)    [facility], [address], [place] Theron Solis PA-C    Office Visit    7 months ago BARBARA (obstructive sleep apnea)    [facility], [facility] Beverly Uribe DO    Office Visit    9 months ago Acute cough    [facility], [address], [place] Theron Solis PA-C    Office Visit

## 2024-10-22 ENCOUNTER — OFFICE VISIT (OUTPATIENT)
Dept: FAMILY MEDICINE CLINIC | Facility: CLINIC | Age: 39
End: 2024-10-22
Payer: COMMERCIAL

## 2024-10-22 VITALS
HEART RATE: 97 BPM | BODY MASS INDEX: 41.75 KG/M2 | HEIGHT: 73 IN | SYSTOLIC BLOOD PRESSURE: 146 MMHG | DIASTOLIC BLOOD PRESSURE: 92 MMHG | RESPIRATION RATE: 18 BRPM | WEIGHT: 315 LBS

## 2024-10-22 DIAGNOSIS — I10 HYPERTENSION GOAL BP (BLOOD PRESSURE) < 130/80: ICD-10-CM

## 2024-10-22 DIAGNOSIS — E11.9 TYPE 2 DIABETES MELLITUS WITHOUT COMPLICATION, WITHOUT LONG-TERM CURRENT USE OF INSULIN (HCC): ICD-10-CM

## 2024-10-22 DIAGNOSIS — I10 ESSENTIAL HYPERTENSION: ICD-10-CM

## 2024-10-22 DIAGNOSIS — Z00.00 ROUTINE GENERAL MEDICAL EXAMINATION AT A HEALTH CARE FACILITY: Primary | ICD-10-CM

## 2024-10-22 PROBLEM — I15.2 OBESITY, DIABETES, AND HYPERTENSION SYNDROME (HCC): Status: RESOLVED | Noted: 2019-06-22 | Resolved: 2024-10-22

## 2024-10-22 PROBLEM — E11.59 OBESITY, DIABETES, AND HYPERTENSION SYNDROME (HCC): Status: RESOLVED | Noted: 2019-06-22 | Resolved: 2024-10-22

## 2024-10-22 PROBLEM — E11.69 OBESITY, DIABETES, AND HYPERTENSION SYNDROME (HCC): Status: RESOLVED | Noted: 2019-06-22 | Resolved: 2024-10-22

## 2024-10-22 PROBLEM — E66.9 OBESITY, DIABETES, AND HYPERTENSION SYNDROME (HCC): Status: RESOLVED | Noted: 2019-06-22 | Resolved: 2024-10-22

## 2024-10-22 LAB — HEMOGLOBIN A1C: 6.6 % (ref 4.3–5.6)

## 2024-10-22 PROCEDURE — 3008F BODY MASS INDEX DOCD: CPT | Performed by: PHYSICIAN ASSISTANT

## 2024-10-22 PROCEDURE — 3077F SYST BP >= 140 MM HG: CPT | Performed by: PHYSICIAN ASSISTANT

## 2024-10-22 PROCEDURE — 3044F HG A1C LEVEL LT 7.0%: CPT | Performed by: PHYSICIAN ASSISTANT

## 2024-10-22 PROCEDURE — 3080F DIAST BP >= 90 MM HG: CPT | Performed by: PHYSICIAN ASSISTANT

## 2024-10-22 PROCEDURE — 99395 PREV VISIT EST AGE 18-39: CPT | Performed by: PHYSICIAN ASSISTANT

## 2024-10-22 PROCEDURE — 83036 HEMOGLOBIN GLYCOSYLATED A1C: CPT | Performed by: PHYSICIAN ASSISTANT

## 2024-10-22 RX ORDER — OMEPRAZOLE 40 MG/1
40 CAPSULE, DELAYED RELEASE ORAL DAILY
COMMUNITY
Start: 2024-08-30

## 2024-10-22 RX ORDER — LOSARTAN POTASSIUM 100 MG/1
100 TABLET ORAL DAILY
Qty: 90 TABLET | Refills: 3 | Status: SHIPPED | OUTPATIENT
Start: 2024-10-22

## 2024-10-22 RX ORDER — METOPROLOL SUCCINATE 25 MG/1
25 TABLET, EXTENDED RELEASE ORAL DAILY
Qty: 90 TABLET | Refills: 3 | Status: SHIPPED | OUTPATIENT
Start: 2024-10-22 | End: 2025-01-20

## 2024-10-22 RX ORDER — AMLODIPINE BESYLATE 10 MG/1
10 TABLET ORAL DAILY
Qty: 90 TABLET | Refills: 3 | Status: SHIPPED | OUTPATIENT
Start: 2024-10-22

## 2024-10-22 RX ORDER — HYDROCHLOROTHIAZIDE 25 MG/1
25 TABLET ORAL DAILY
Qty: 90 TABLET | Refills: 3 | Status: SHIPPED | OUTPATIENT
Start: 2024-10-22

## 2024-10-22 NOTE — PROGRESS NOTES
HPI:   Elvis Eldridge Jr. is a 39 year old male who presents for an Annual Health Visit.   The patient is doing fine at this time. The patient denies chest pain, SOB, N/V/C/D, fever, dizziness, syncope, and abdominal pain. There are no other concerns today.      Allergies:   Allergies[1]    CURRENT MEDICATIONS   Current Outpatient Medications   Medication Sig Dispense Refill    Omeprazole 40 MG Oral Capsule Delayed Release Take 1 capsule (40 mg total) by mouth daily.      amLODIPine 10 MG Oral Tab Take 1 tablet (10 mg total) by mouth daily. 90 tablet 3    empagliflozin (JARDIANCE) 25 MG Oral Tab Take 1 tablet (25 mg total) by mouth every morning. 90 tablet 3    hydroCHLOROthiazide 25 MG Oral Tab Take 1 tablet (25 mg total) by mouth daily. 90 tablet 3    losartan 100 MG Oral Tab Take 1 tablet (100 mg total) by mouth daily. 90 tablet 3    metoprolol succinate ER 25 MG Oral Tablet 24 Hr Take 1 tablet (25 mg total) by mouth daily. 90 tablet 3    Tirzepatide (MOUNJARO) 7.5 MG/0.5ML Subcutaneous Solution Pen-injector Inject 7.5 mg into the skin every 7 days. 6 mL 1    Blood Glucose Monitoring Suppl (ONETOUCH VERIO FLEX SYSTEM) w/Device Does not apply Kit Test blood sugar two times daily. 1 kit 0    Continuous Blood Gluc Sensor (DEXCOM G7 SENSOR) Does not apply Misc 1 each Every 10 days. Use as directed every 10 days 18 each 0    Continuous Blood Gluc Sensor (DEXCOM G7 SENSOR) Does not apply Misc 1 each Every 10 days. Use as directed every 10 days 18 each 0      HISTORICAL INFORMATION   Past Medical History:    Essential hypertension    Morbid obesity (HCC)    Recurrent kidney stones      History reviewed. No pertinent surgical history.   Family History   Problem Relation Age of Onset    Hypertension Father     Diabetes Father     Other (Kidney Stones) Father     Breast Cancer Mother     Hypertension Mother     Diabetes Maternal Grandmother     Prostate Cancer Maternal Grandfather       SOCIAL HISTORY   Social  History     Socioeconomic History    Marital status:    Occupational History    Occupation:    Tobacco Use    Smoking status: Never    Smokeless tobacco: Never   Vaping Use    Vaping status: Never Used   Substance and Sexual Activity    Alcohol use: Not Currently     Alcohol/week: 0.0 standard drinks of alcohol     Comment: 2-3 beers 1-2 days weekly    Drug use: No     Social Drivers of Health      [facility]    Social Connections    [facility]    Housing Stability     Social History     Social History Narrative    Not on file        REVIEW OF SYSTEMS:     Review of Systems   Constitutional: Negative.    HENT: Negative.     Eyes: Negative.    Respiratory: Negative.     Cardiovascular: Negative.    Gastrointestinal: Negative.    Genitourinary: Negative.    Musculoskeletal: Negative.    Skin: Negative.    Neurological: Negative.    Psychiatric/Behavioral: Negative.           EXAM:   BP (!) 146/92   Pulse 97   Resp 18   Ht 6' 1\" (1.854 m)   Wt (!) 397 lb (180.1 kg)   BMI 52.38 kg/m²    Wt Readings from Last 6 Encounters:   10/22/24 (!) 397 lb (180.1 kg)   09/07/24 (!) 402 lb (182.3 kg)   03/25/24 (!) 391 lb (177.4 kg)   03/13/24 (!) 374 lb (169.6 kg)   02/08/24 (!) 384 lb (174.2 kg)   12/21/23 (!) 378 lb (171.5 kg)     Body mass index is 52.38 kg/m².    Physical Exam  Vitals reviewed.   Constitutional:       Appearance: He is well-developed.   HENT:      Head: Normocephalic and atraumatic.      Right Ear: Tympanic membrane, ear canal and external ear normal. There is no impacted cerumen.      Left Ear: Tympanic membrane, ear canal and external ear normal. There is no impacted cerumen.      Nose: Nose normal.      Mouth/Throat:      Mouth: Mucous membranes are moist.      Pharynx: Oropharynx is clear. No oropharyngeal exudate or posterior oropharyngeal erythema.   Eyes:       General:         Right eye: No discharge.         Left eye: No discharge.      Conjunctiva/sclera: Conjunctivae normal.   Cardiovascular:      Rate and Rhythm: Normal rate and regular rhythm.      Heart sounds: Normal heart sounds.   Pulmonary:      Effort: Pulmonary effort is normal.      Breath sounds: Normal breath sounds.   Abdominal:      General: Abdomen is flat. Bowel sounds are normal. There is no distension.      Palpations: Abdomen is soft.      Tenderness: There is no abdominal tenderness. There is no right CVA tenderness or left CVA tenderness.   Musculoskeletal:         General: Normal range of motion.      Cervical back: Normal range of motion and neck supple.   Neurological:      Mental Status: He is alert and oriented to person, place, and time.   Psychiatric:         Mood and Affect: Mood normal.         Behavior: Behavior normal.         Thought Content: Thought content normal.         Judgment: Judgment normal.          ASSESSMENT AND PLAN:   Elvis was seen today for physical.    Diagnoses and all orders for this visit:    Routine general medical examination at a health care facility  -     CBC With Differential With Platelet; Future  -     Comp Metabolic Panel (14); Future  -     Lipid Panel; Future  -     TSH W Reflex To Free T4; Future  Overall health discussed, exercise/activity appropriate for age and health status, heathy diety, preventive care, and upcoming screening discussed. Routine labs ordered.      Type 2 diabetes mellitus without complication, without long-term current use of insulin ([facility])  -     POC Glycohemoglobin [88965]  -     empagliflozin (JARDIANCE) 25 MG Oral Tab; Take 1 tablet (25 mg total) by mouth every morning.    Continue with Jardiance 25 mg QAM and Mounjaro 7.5 mg Qweek.    Bilateral barefoot skin diabetic exam is normal, visualized feet and the appearance is normal.  Bilateral monofilament/sensation of both feet is normal.  Pulsation pedal pulse exam of both  lower legs/feet is normal as well.        Essential hypertension  -     amLODIPine 10 MG Oral Tab; Take 1 tablet (10 mg total) by mouth daily.  -     hydroCHLOROthiazide 25 MG Oral Tab; Take 1 tablet (25 mg total) by mouth daily.  -     losartan 100 MG Oral Tab; Take 1 tablet (100 mg total) by mouth daily.  -     metoprolol succinate ER 25 MG Oral Tablet 24 Hr; Take 1 tablet (25 mg total) by mouth daily.    Continue with Amlodipine 10 mg every day, hydrochlorothiazide 25 mg every day and Losartan 100 mg every day. Start Metoprolol ER 25 mg every day.    Hypertension goal BP (blood pressure) < 130/80  -     hydroCHLOROthiazide 25 MG Oral Tab; Take 1 tablet (25 mg total) by mouth daily.  -     losartan 100 MG Oral Tab; Take 1 tablet (100 mg total) by mouth daily.      There are no Patient Instructions on file for this visit.    The patient indicates understanding of these issues and agrees to the plan.    Problem List:  Patient Active Problem List   Diagnosis    Recurrent kidney stones    Hypertension goal BP (blood pressure) < 130/80    Obesity, diabetes, and hypertension syndrome ([facility])    Type 2 diabetes mellitus without complication, without long-term current use of insulin ([facility])    Essential hypertension    Mixed hyperlipidemia    BARBARA (obstructive sleep apnea)    Gastroesophageal reflux disease without esophagitis    Class 3 severe obesity due to excess calories without serious comorbidity with body mass index (BMI) of 50.0 to 59.9 in adult ([facility])       Theron Solis PA-C  10/22/2024  4:35 PM    F/U in 1 month.           [1] No Known Allergies

## 2024-10-26 ENCOUNTER — LAB ENCOUNTER (OUTPATIENT)
Dept: LAB | Age: 39
End: 2024-10-26
Attending: PHYSICIAN ASSISTANT
Payer: COMMERCIAL

## 2024-10-26 DIAGNOSIS — Z00.00 ROUTINE GENERAL MEDICAL EXAMINATION AT A HEALTH CARE FACILITY: ICD-10-CM

## 2024-10-26 LAB
ALBUMIN SERPL-MCNC: 4.6 G/DL (ref 3.2–4.8)
ALBUMIN/GLOB SERPL: 1.6 {RATIO} (ref 1–2)
ALP LIVER SERPL-CCNC: 109 U/L
ALT SERPL-CCNC: 51 U/L
ANION GAP SERPL CALC-SCNC: 5 MMOL/L (ref 0–18)
AST SERPL-CCNC: 29 U/L (ref ?–34)
BASOPHILS # BLD AUTO: 0.07 X10(3) UL (ref 0–0.2)
BASOPHILS NFR BLD AUTO: 0.9 %
BILIRUB SERPL-MCNC: 0.5 MG/DL (ref 0.3–1.2)
BUN BLD-MCNC: 15 MG/DL (ref 9–23)
BUN/CREAT SERPL: 14.7 (ref 10–20)
CALCIUM BLD-MCNC: 9.4 MG/DL (ref 8.7–10.4)
CHLORIDE SERPL-SCNC: 108 MMOL/L (ref 98–112)
CHOLEST SERPL-MCNC: 160 MG/DL (ref ?–200)
CO2 SERPL-SCNC: 29 MMOL/L (ref 21–32)
CREAT BLD-MCNC: 1.02 MG/DL
DEPRECATED RDW RBC AUTO: 42.9 FL (ref 35.1–46.3)
EGFRCR SERPLBLD CKD-EPI 2021: 97 ML/MIN/1.73M2 (ref 60–?)
EOSINOPHIL # BLD AUTO: 0.16 X10(3) UL (ref 0–0.7)
EOSINOPHIL NFR BLD AUTO: 2.2 %
ERYTHROCYTE [DISTWIDTH] IN BLOOD BY AUTOMATED COUNT: 12.3 % (ref 11–15)
FASTING PATIENT LIPID ANSWER: YES
FASTING STATUS PATIENT QL REPORTED: YES
GLOBULIN PLAS-MCNC: 2.8 G/DL (ref 2–3.5)
GLUCOSE BLD-MCNC: 171 MG/DL (ref 70–99)
HCT VFR BLD AUTO: 46.6 %
HDLC SERPL-MCNC: 32 MG/DL (ref 40–59)
HGB BLD-MCNC: 15.9 G/DL
IMM GRANULOCYTES # BLD AUTO: 0.02 X10(3) UL (ref 0–1)
IMM GRANULOCYTES NFR BLD: 0.3 %
LDLC SERPL CALC-MCNC: 107 MG/DL (ref ?–100)
LYMPHOCYTES # BLD AUTO: 3.13 X10(3) UL (ref 1–4)
LYMPHOCYTES NFR BLD AUTO: 42.3 %
MCH RBC QN AUTO: 31.9 PG (ref 26–34)
MCHC RBC AUTO-ENTMCNC: 34.1 G/DL (ref 31–37)
MCV RBC AUTO: 93.4 FL
MONOCYTES # BLD AUTO: 0.53 X10(3) UL (ref 0.1–1)
MONOCYTES NFR BLD AUTO: 7.2 %
NEUTROPHILS # BLD AUTO: 3.49 X10 (3) UL (ref 1.5–7.7)
NEUTROPHILS # BLD AUTO: 3.49 X10(3) UL (ref 1.5–7.7)
NEUTROPHILS NFR BLD AUTO: 47.1 %
NONHDLC SERPL-MCNC: 128 MG/DL (ref ?–130)
OSMOLALITY SERPL CALC.SUM OF ELEC: 299 MOSM/KG (ref 275–295)
PLATELET # BLD AUTO: 194 10(3)UL (ref 150–450)
POTASSIUM SERPL-SCNC: 4.3 MMOL/L (ref 3.5–5.1)
PROT SERPL-MCNC: 7.4 G/DL (ref 5.7–8.2)
RBC # BLD AUTO: 4.99 X10(6)UL
SODIUM SERPL-SCNC: 142 MMOL/L (ref 136–145)
TRIGL SERPL-MCNC: 117 MG/DL (ref 30–149)
TSI SER-ACNC: 0.85 MIU/ML (ref 0.55–4.78)
VLDLC SERPL CALC-MCNC: 20 MG/DL (ref 0–30)
WBC # BLD AUTO: 7.4 X10(3) UL (ref 4–11)

## 2024-10-26 PROCEDURE — 85025 COMPLETE CBC W/AUTO DIFF WBC: CPT

## 2024-10-26 PROCEDURE — 84443 ASSAY THYROID STIM HORMONE: CPT

## 2024-10-26 PROCEDURE — 36415 COLL VENOUS BLD VENIPUNCTURE: CPT

## 2024-10-26 PROCEDURE — 80053 COMPREHEN METABOLIC PANEL: CPT

## 2024-10-26 PROCEDURE — 80061 LIPID PANEL: CPT

## 2024-12-17 ENCOUNTER — OFFICE VISIT (OUTPATIENT)
Dept: PULMONOLOGY | Facility: CLINIC | Age: 39
End: 2024-12-17

## 2024-12-17 VITALS
DIASTOLIC BLOOD PRESSURE: 87 MMHG | HEIGHT: 73 IN | SYSTOLIC BLOOD PRESSURE: 135 MMHG | OXYGEN SATURATION: 96 % | BODY MASS INDEX: 41.75 KG/M2 | HEART RATE: 82 BPM | WEIGHT: 315 LBS | RESPIRATION RATE: 16 BRPM

## 2024-12-17 DIAGNOSIS — G47.33 OSA (OBSTRUCTIVE SLEEP APNEA): Primary | ICD-10-CM

## 2024-12-17 PROCEDURE — 3079F DIAST BP 80-89 MM HG: CPT | Performed by: INTERNAL MEDICINE

## 2024-12-17 PROCEDURE — 99213 OFFICE O/P EST LOW 20 MIN: CPT | Performed by: INTERNAL MEDICINE

## 2024-12-17 PROCEDURE — 3008F BODY MASS INDEX DOCD: CPT | Performed by: INTERNAL MEDICINE

## 2024-12-17 PROCEDURE — 3075F SYST BP GE 130 - 139MM HG: CPT | Performed by: INTERNAL MEDICINE

## 2024-12-17 NOTE — PROGRESS NOTES
Referring Physician  Chilo Messer, DO    History of Present Illness  Patient seen today for follow-up visit at pulmonary clinic.  Patient has been using his CPAP device with significant improvement in hypersomnia and fatigue.  He is tolerating his device well.  Denies any significant dyspnea symptoms.    Medications  Current Outpatient Medications on File Prior to Visit   Medication Sig Dispense Refill    rosuvastatin 10 MG Oral Tab Take 1 tablet (10 mg total) by mouth nightly. 90 tablet 3    Omeprazole 40 MG Oral Capsule Delayed Release Take 1 capsule (40 mg total) by mouth daily.      amLODIPine 10 MG Oral Tab Take 1 tablet (10 mg total) by mouth daily. 90 tablet 3    empagliflozin (JARDIANCE) 25 MG Oral Tab Take 1 tablet (25 mg total) by mouth every morning. 90 tablet 3    hydroCHLOROthiazide 25 MG Oral Tab Take 1 tablet (25 mg total) by mouth daily. 90 tablet 3    losartan 100 MG Oral Tab Take 1 tablet (100 mg total) by mouth daily. 90 tablet 3    metoprolol succinate ER 25 MG Oral Tablet 24 Hr Take 1 tablet (25 mg total) by mouth daily. 90 tablet 3    Tirzepatide (MOUNJARO) 7.5 MG/0.5ML Subcutaneous Solution Pen-injector Inject 7.5 mg into the skin every 7 days. 6 mL 1    Blood Glucose Monitoring Suppl (ONETOUCH VERIO FLEX SYSTEM) w/Device Does not apply Kit Test blood sugar two times daily. 1 kit 0    Continuous Blood Gluc Sensor (DEXCOM G7 SENSOR) Does not apply Misc 1 each Every 10 days. Use as directed every 10 days 18 each 0    Continuous Blood Gluc Sensor (DEXCOM G7 SENSOR) Does not apply Misc 1 each Every 10 days. Use as directed every 10 days 18 each 0     No current facility-administered medications on file prior to visit.       Allergies  No Known Allergies    Physical Exam  Constitutional: no acute distress  HEENT: PERRL  Cardio: RRR, S1 S2  Respiratory: clear to auscultation bilaterally, no wheezing, rales, rhonchi, crackles  GI: abdomen soft, non tender  Extremities: no clubbing, cyanosis,  edema  Neurologic: no gross motor deficits  Skin: warm, dry  Lymphatic: no supraclavicular lymphadenopathy     Assessment  1.  Severe obstructive sleep apnea  2.  Obesity    Plan  -Patient seen today for management of underlying obstructive sleep apnea.  I am glad to hear he is benefiting from his CPAP device.  Review download data over the last 30 days with 100% usage and average usage of 6 hours and 20 minutes which is acceptable.  His AHI is 2.9 which is also acceptable.  I advised him to continue using CPAP device on a nightly basis.  I encourage weight loss if possible.  Patient benefiting from his CPAP device.  Patient requesting to increase lower pressure setting to 7 CWP which I will place order for.      Beverly Uribe,   Pulmonary Medicine  [facility]

## 2025-01-19 ENCOUNTER — PATIENT MESSAGE (OUTPATIENT)
Dept: PULMONOLOGY | Facility: CLINIC | Age: 40
End: 2025-01-19

## 2025-01-26 ENCOUNTER — PATIENT MESSAGE (OUTPATIENT)
Dept: FAMILY MEDICINE CLINIC | Facility: CLINIC | Age: 40
End: 2025-01-26

## 2025-01-27 ENCOUNTER — HOSPITAL ENCOUNTER (OUTPATIENT)
Age: 40
Discharge: HOME | End: 2025-01-27
Payer: COMMERCIAL

## 2025-01-27 VITALS
HEART RATE: 98 BPM | SYSTOLIC BLOOD PRESSURE: 150 MMHG | RESPIRATION RATE: 16 BRPM | DIASTOLIC BLOOD PRESSURE: 90 MMHG | TEMPERATURE: 99 F | OXYGEN SATURATION: 96 %

## 2025-01-27 DIAGNOSIS — U07.1 COVID-19: Primary | ICD-10-CM

## 2025-01-27 LAB — SARS-COV-2 RNA RESP QL NAA+PROBE: DETECTED

## 2025-01-27 PROCEDURE — 99212 OFFICE O/P EST SF 10 MIN: CPT

## 2025-01-27 PROCEDURE — 99213 OFFICE O/P EST LOW 20 MIN: CPT

## 2025-01-27 RX ORDER — TIRZEPATIDE 7.5 MG/.5ML
7.5 INJECTION, SOLUTION SUBCUTANEOUS
Qty: 2 ML | Refills: 0 | Status: SHIPPED | OUTPATIENT
Start: 2025-01-27

## 2025-01-27 NOTE — TELEPHONE ENCOUNTER
Please review. Protocol Failed; No Protocol    Routing to podmates due to High Priority status and Theron BEAVERS is out of office.     Future Appointments   Date Time Provider Department Center   1/28/2025  2:30 PM Theron Solis PA-C [facility] [place]          Requested Prescriptions   Pending Prescriptions Disp Refills    Tirzepatide (MOUNJARO) 7.5 MG/0.5ML Subcutaneous Solution Auto-injector  0     Sig: Inject into the skin.       Diabetes Medication Protocol Passed - 1/27/2025 12:34 PM        Passed - Last A1C < 7.5 and within past 6 months     Lab Results   Component Value Date    A1C 6.6 (A) 10/22/2024             Passed - In person appointment or virtual visit in the past 6 mos or appointment in next 3 mos     Recent Outpatient Visits              1 month ago BARBARA (obstructive sleep apnea)    [facility] Beverly Uribe DO    Office Visit    3 months ago [facility]ine [facility]    [facility] Visit    4 months ago Acute non-recurrent maxillary sinusitis    [facility]    Office Visit    10 months ago Type 2 diabetes mellitus without complication, without long-term current use of insulin ([facility])    [facility] Barron Mack [facility]    Office Visit    10 months ago Type 2 diabetes mellitus without complication, without long-term current use of insulin ([facility]    [facility] Visit          Future Appointments         Provider Department Appt Notes    Tomorrow Theron Solis PA-C [facility] [address], [place] Congestion                    Passed - Microalbumin procedure in past 12 months or taking ACE/ARB        Passed - EGFRCR or GFRNAA > 50      GFR Evaluation  EGFRCR: 97 , resulted on 10/26/2024          Passed - GFR in the past 12 months        Passed - Medication is active on med list               Future Appointments         Provider Department Appt Notes    Tomorrow Theron Solis PA-C [facility] [address], [place]          Recent Outpatient Visits              1 month ago BARBARA (obstructive sleep apnea)    [facility] Beverly Uirbe DO    Office Visit    3 months ago Routine general medical examination at a health care facility    [facility] [address], [place] Theron Solis PA-C    Office Visit    4 months ago Acute non-recurrent maxillary sinusitis    [facility] [address], [place] Theron Solis PA-C    Office Visit    10 months ago Type 2 diabetes mellitus without complication, without long-term current use of insulin ([facility])    [facility] Barron Mack MD    Office Visit    10 months ago Type 2 diabetes mellitus without complication, without long-term current use of insulin ([facility])    [facility] [address], [place] Theron Solis PA-C    Office Visit

## 2025-01-27 NOTE — ED PROVIDER NOTES
Patient Seen in: [facility] [place]      History     Chief Complaint   Patient presents with    Nasal Congestion     Stated Complaint: sinus pressure    Subjective:   HPI      This is a 39-year-old male with history of hypertension and obesity presenting with nasal congestion and sinus pressure.  Patient states he has had his symptoms for 1 day did not do a COVID test at home denies any cough fever neck pain or stiffness chest pain or shortness of breath.  Patient states he did take Mucinex he does feel like he is getting dripping on the back of his throat.    Objective:     Past Medical History:    Essential hypertension    Morbid obesity (HCC)    Recurrent kidney stones              History reviewed. No pertinent surgical history.             Social History     Socioeconomic History    Marital status:    Occupational History    Occupation:    Tobacco Use    Smoking status: Never     Passive exposure: Never    Smokeless tobacco: Never   Vaping Use    Vaping status: Never Used   Substance and Sexual Activity    Alcohol use: Not Currently     Alcohol/week: 0.0 standard drinks of alcohol     Comment: 2-3 beers 1-2 days weekly    Drug use: No     Social Drivers of Health      [facility]    Social Connections    Received from [facility], [facility]    Housing Stability              Review of Systems    Positive for stated complaint: sinus pressure  Other systems are as noted in HPI.  Constitutional and vital signs reviewed.      All other systems reviewed and negative except as noted above.    Physical Exam     ED Triage Vitals   BP 01/27/25 1016 150/90   Pulse 01/27/25 1016 107   Resp 01/27/25 1016 16   Temp 01/27/25 1016 98.9 °F (37.2 °C)   Temp src --    SpO2 01/27/25 1034 96 %   O2 Device 01/27/25 1034 None (Room air)       Current Vitals:   Vital Signs  BP: 150/90  Pulse: 98  Resp:  16  Temp: 98.9 °F (37.2 °C)    Oxygen Therapy  SpO2: 96 %  O2 Device: None (Room air)        Physical Exam  Vitals and nursing note reviewed.   Constitutional:       Appearance: Normal appearance.   HENT:      Right Ear: Tympanic membrane normal.      Left Ear: Tympanic membrane normal.      Nose: Congestion and rhinorrhea present.      Mouth/Throat:      Mouth: Mucous membranes are moist.      Pharynx: Oropharynx is clear. No posterior oropharyngeal erythema.   Eyes:      Conjunctiva/sclera: Conjunctivae normal.   Cardiovascular:      Rate and Rhythm: Normal rate.   Pulmonary:      Effort: Pulmonary effort is normal. No respiratory distress.      Breath sounds: Normal breath sounds. No wheezing.   Musculoskeletal:         General: Normal range of motion.      Cervical back: Normal range of motion. No rigidity or tenderness.   Lymphadenopathy:      Cervical: No cervical adenopathy.   Skin:     General: Skin is warm.      Capillary Refill: Capillary refill takes less than 2 seconds.   Neurological:      General: No focal deficit present.      Mental Status: He is alert and oriented to person, place, and time.             ED Course     Labs Reviewed   RAPID SARS-COV-2 BY PCR - Abnormal; Notable for the following components:       Result Value    Rapid SARS-CoV-2 by PCR Detected (*)     All other components within normal limits            MDM           Medical Decision Making  39-year-old male nontoxic-appearing afebrile no hypoxia or distress presenting with nasal congestion and sinus pressure for 1 day.  DDx COVID versus a head cold versus another respiratory or viral illness versus viral sinusitis as clinically he does not have a bacterial sinusitis and this was discussed with the patient and offered a COVID test discussed over-the-counter medications to help with symptoms.  Patient is agreeable with this plan of care.    COVID-positive patient made aware of results discussed over-the-counter meds to help with  symptoms supportive therapy.  All education outpatient follow-up ER precautions placed in discharge paperwork.  Patient acknowledged understanding discharge instructions.    Problems Addressed:  COVID-19: acute illness or injury    Amount and/or Complexity of Data Reviewed  Labs: ordered. Decision-making details documented in ED Course.    Risk  OTC drugs.        Disposition and Plan     Clinical Impression:  1. COVID-19         Disposition:  Discharge  1/27/2025 10:48 am    Follow-up:  Chilo Messer, DO  [address] [place] [address]  [phone]    In 1 week            Medications Prescribed:  Discharge Medication List as of 1/27/2025 10:50 AM              Supplementary Documentation:

## 2025-01-27 NOTE — ED INITIAL ASSESSMENT (HPI)
Patient arrived ambulatory to room c/o symptoms that started yesterday. +nasal congestion +facial/nasal pressure. No fevers. No cough.

## 2025-01-27 NOTE — DISCHARGE INSTRUCTIONS
Recommend over-the-counter Flonase and Zyrtec to help with runny nose congestion and postnasal drip take Tylenol for pain drink plenty of fluids and eat as tolerated if your doctor has never had a problem with you taking NSAIDs such as ibuprofen you may take this to help with the sinus pressure.  If you develop high fever chest pain shortness of breath vomiting diarrhea or any new or worsening symptoms go to the nearest emergency department.

## 2025-01-27 NOTE — TELEPHONE ENCOUNTER
Pended. Routed to pool to run protocol.  Pharmacy updated.    Routing high priority as patient is out of medication.

## 2025-01-28 ENCOUNTER — TELEMEDICINE (OUTPATIENT)
Dept: FAMILY MEDICINE CLINIC | Facility: CLINIC | Age: 40
End: 2025-01-28
Payer: COMMERCIAL

## 2025-01-28 DIAGNOSIS — U07.1 COVID-19 VIRUS INFECTION: Primary | ICD-10-CM

## 2025-01-28 DIAGNOSIS — E11.9 TYPE 2 DIABETES MELLITUS WITHOUT COMPLICATION, WITHOUT LONG-TERM CURRENT USE OF INSULIN (HCC): ICD-10-CM

## 2025-01-28 PROCEDURE — 98004 SYNCH AUDIO-VIDEO EST SF 10: CPT | Performed by: PHYSICIAN ASSISTANT

## 2025-01-28 RX ORDER — TIRZEPATIDE 7.5 MG/.5ML
INJECTION, SOLUTION SUBCUTANEOUS
Refills: 0 | OUTPATIENT
Start: 2025-01-28

## 2025-01-28 RX ORDER — TIRZEPATIDE 7.5 MG/.5ML
7.5 INJECTION, SOLUTION SUBCUTANEOUS
Qty: 6 ML | Refills: 3 | Status: SHIPPED | OUTPATIENT
Start: 2025-01-28 | End: 2025-01-31

## 2025-01-28 NOTE — ASSESSMENT & PLAN NOTE
Orders:    Tirzepatide (MOUNJARO) 7.5 MG/0.5ML Subcutaneous Solution Auto-injector; Inject 7.5 mg into the skin every 7 days.  Stable. Continue current management.

## 2025-01-28 NOTE — PROGRESS NOTES
HPI:     I spoke to Elvis Buckner Jazmyn Brown via video call, verified date of birth, and discussed current concerns.    HPI  The patient tested positive for Covid yesterday. His symptoms started 3 days ago. He complains of nasal congestion. He is using Flonase daily.  He requests Paxlovid.    Medications:     Current Outpatient Medications   Medication Sig Dispense Refill    nirmatrelvir-ritonavir 300-100 MG Oral Tablet Therapy Pack Take two nirmatrelvir tablets (300mg) with one ritonavir tablet (100mg) together twice daily for 5 days. 30 tablet 0    Tirzepatide (MOUNJARO) 7.5 MG/0.5ML Subcutaneous Solution Auto-injector Inject 7.5 mg into the skin every 7 days. 6 mL 3    Omeprazole 40 MG Oral Capsule Delayed Release Take 1 capsule (40 mg total) by mouth daily.      amLODIPine 10 MG Oral Tab Take 1 tablet (10 mg total) by mouth daily. 90 tablet 3    empagliflozin (JARDIANCE) 25 MG Oral Tab Take 1 tablet (25 mg total) by mouth every morning. 90 tablet 3    hydroCHLOROthiazide 25 MG Oral Tab Take 1 tablet (25 mg total) by mouth daily. 90 tablet 3    losartan 100 MG Oral Tab Take 1 tablet (100 mg total) by mouth daily. 90 tablet 3    Continuous Blood Gluc Sensor (DEXCOM G7 SENSOR) Does not apply Misc 1 each Every 10 days. Use as directed every 10 days 18 each 0    Blood Glucose Monitoring Suppl (ONETOUCH VERIO FLEX SYSTEM) w/Device Does not apply Kit Test blood sugar two times daily. 1 kit 0       Allergies:   Allergies[1]    History:     Health Maintenance   Topic Date Due    COVID-19 Vaccine (3 - 2024-25 season) 09/01/2024    Influenza Vaccine (1) 10/01/2024    Annual Depression Screening  01/01/2025    Diabetes Care: Foot Exam (Annual)  01/01/2025    Diabetes Care: Microalb/Creat Ratio (Annual)  01/01/2025    Diabetes Care Dilated Eye Exam  02/14/2025    Diabetes Care A1C  04/22/2025    Annual Physical  10/22/2025    Diabetes Care: GFR  10/26/2025    DTaP,Tdap,and Td Vaccines (2 - Td or Tdap) 06/01/2026     Pneumococcal Vaccine: Birth to 50yrs (3 of 3 - PCV20 or PCV21) 10/15/2037    Meningococcal B Vaccine  Aged Out       No LMP for male patient.   Past Medical History:     Past Medical History:    Essential hypertension    Morbid obesity (HCC)    Recurrent kidney stones       Past Surgical History:   No past surgical history on file.    Family History:     Family History   Problem Relation Age of Onset    Hypertension Father     Diabetes Father     Other (Kidney Stones) Father     Breast Cancer Mother     Hypertension Mother     Diabetes Maternal Grandmother     Prostate Cancer Maternal Grandfather        Social History:     Social History     Socioeconomic History    Marital status:      Spouse name: Not on file    Number of children: Not on file    Years of education: Not on file    Highest education level: Not on file   Occupational History    Occupation:    Tobacco Use    Smoking status: Never     Passive exposure: Never    Smokeless tobacco: Never   Vaping Use    Vaping status: Never Used   Substance and Sexual Activity    Alcohol use: Not Currently     Alcohol/week: 0.0 standard drinks of alcohol     Comment: 2-3 beers 1-2 days weekly    Drug use: No    Sexual activity: Not on file   Other Topics Concern    Not on file   Social History Narrative    Not on file     Social Drivers of Health     Financial Resource Strain: Not on file   Food Insecurity: Not on file   Transportation Needs: Not on file   Physical Activity: Not on file   Stress: Not on file   Social Connections: Unknown (3/18/2021)    [facility]    Social Connections     Conversations with friends/family/neighbors per week: Not on file   Housing Stability: Low Risk  (7/7/2021)    [facility] from [facility]    Housing Stability     Mortgage Payment Concerns?: Not on file     Number of Places Lived in the Last Year: Not on  file     Unstable Housing?: Not on file       Review of Systems:   Review of Systems   HENT:  Positive for congestion.         There were no vitals filed for this visit.  There is no height or weight on file to calculate BMI.    Physical Exam:   Physical Exam   Patient alert and orient x3, able to carry on conversation easily, without shortness of breath, coughing, can speak in full sentences.    Assessment and Plan::     Assessment & Plan  COVID-19 virus infection    Orders:    nirmatrelvir-ritonavir 300-100 MG Oral Tablet Therapy Pack; Take two nirmatrelvir tablets (300mg) with one ritonavir tablet (100mg) together twice daily for 5 days.    Type 2 diabetes mellitus without complication, without long-term current use of insulin (HCC)    Orders:    Tirzepatide (MOUNJARO) 7.5 MG/0.5ML Subcutaneous Solution Auto-injector; Inject 7.5 mg into the skin every 7 days.  Stable. Continue current management.       Discussed plan of care with pt and pt is in agreement.All questions answered. Pt to call with questions or concerns.         [1] No Known Allergies

## 2025-01-31 ENCOUNTER — TELEPHONE (OUTPATIENT)
Dept: FAMILY MEDICINE CLINIC | Facility: CLINIC | Age: 40
End: 2025-01-31

## 2025-01-31 DIAGNOSIS — E11.9 TYPE 2 DIABETES MELLITUS WITHOUT COMPLICATION, WITHOUT LONG-TERM CURRENT USE OF INSULIN (HCC): ICD-10-CM

## 2025-01-31 RX ORDER — TIRZEPATIDE 7.5 MG/.5ML
7.5 INJECTION, SOLUTION SUBCUTANEOUS
Qty: 6 ML | Refills: 3 | Status: SHIPPED | OUTPATIENT
Start: 2025-01-31

## 2025-01-31 NOTE — TELEPHONE ENCOUNTER
Min Patient is calling as he stated that he has not received his Tirzepatide (MOUNJARO) 7.5 MG/0.5ML Subcutaneous.   Patient was inform that this medication was sent to his local [facility] on 1/28/25. Patient stated that his medication is suppose to go to [facility] mail pharmacy. Patient upset and he stated this is the third time we do this to him. Patient will like for us to rush this script to his [facility] mail pharmacy.    [facility] called and I cancel the RX that was sent to them on 1/28/25.    I called [facility] and the soonest the patient will be getting the medication is on Thursday. They do not do overnight services.    [facility] is not able to provide patient with 1 pen.    Patient was called and inform of this information. Patient is disappointed and will like to speak to Min.         Disp Refills Start End    Tirzepatide (MOUNJARO) 7.5 MG/0.5ML Subcutaneous Solution Auto-injector 6 mL 3 1/28/2025 --    Sig - Route: Inject 7.5 mg into the skin every 7 days. - Subcutaneous    Sent to pharmacy as: Mounjaro 7.5 MG/0.5ML Subcutaneous Solution Auto-injector (Tirzepatide)    E-Prescribing Status: Receipt confirmed by pharmacy (1/28/2025  2:33 PM CST)      [id number] [place], [place] - [address] AT [facility], [phone], [phone]

## 2025-01-31 NOTE — TELEPHONE ENCOUNTER
Spoke with patient ( & Name verified).    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 Regarding Mounjaro, I advise the patient to contact mail delivery for an automatic refill. The patient verbalized understanding.

## 2025-01-31 NOTE — TELEPHONE ENCOUNTER
Min patient called and he stated that he will only like to talk to you about his condition update. Patient did not want to talk to me. Patient can be reached at [phone].    KEON Patient also called about his medication mounjaro as it was suppose to be sent to his mail pharmacy not his local pharmacy.See medication question encounter for this. Thank you

## 2025-02-25 ENCOUNTER — TELEPHONE (OUTPATIENT)
Dept: FAMILY MEDICINE CLINIC | Facility: CLINIC | Age: 40
End: 2025-02-25

## 2025-02-25 NOTE — TELEPHONE ENCOUNTER
Provider Garland Solis has completed the second page of the forms. (Prescribing Healthcare Provider Data)   Per Garland, patient needs to have medical examiner sign fist page, bottom part.  Medical examiner would be provider who patient sees for DOT.

## 2025-02-25 NOTE — TELEPHONE ENCOUNTER
Patient called to follow up on a form he dropped off at the office on Friday. Patient will like to know when it will be ready so he can come in to pick it up.     Medicine form for a DOT Medical Card.

## 2025-02-26 NOTE — TELEPHONE ENCOUNTER
Spoke to patient and letter requested has been generated and ready for .   Per patient he will be stopping by around 4:30pm.  Full name and  verified.

## 2025-02-26 NOTE — TELEPHONE ENCOUNTER
Patient called. I provided information below per Suzette.     Suzette ROSALES:   Patient had other questions about his forms. Can you please call him back?

## 2025-03-06 DIAGNOSIS — K21.9 GASTROESOPHAGEAL REFLUX DISEASE WITHOUT ESOPHAGITIS: ICD-10-CM

## 2025-03-06 NOTE — TELEPHONE ENCOUNTER
Omeprazole 40 MG Oral Capsule Delayed Release Take 1 capsule (40 mg total) by mouth daily.     Glipizide ER 10 mg tablets                   Take 1 tablet by mouth daily

## 2025-03-07 ENCOUNTER — LAB ENCOUNTER (OUTPATIENT)
Dept: LAB | Age: 40
End: 2025-03-07
Attending: PHYSICIAN ASSISTANT
Payer: COMMERCIAL

## 2025-03-07 DIAGNOSIS — E78.2 MIXED HYPERLIPIDEMIA: ICD-10-CM

## 2025-03-07 DIAGNOSIS — E78.89 LIPIDS ABNORMAL: ICD-10-CM

## 2025-03-07 DIAGNOSIS — E11.9 TYPE 2 DIABETES MELLITUS WITHOUT COMPLICATION, UNSPECIFIED WHETHER LONG TERM INSULIN USE (HCC): ICD-10-CM

## 2025-03-07 LAB
ALBUMIN SERPL-MCNC: 4.4 G/DL (ref 3.2–4.8)
ALBUMIN/GLOB SERPL: 1.4 {RATIO} (ref 1–2)
ALP LIVER SERPL-CCNC: 98 U/L
ALT SERPL-CCNC: 60 U/L
ANION GAP SERPL CALC-SCNC: 6 MMOL/L (ref 0–18)
AST SERPL-CCNC: 32 U/L (ref ?–34)
BASOPHILS # BLD AUTO: 0.08 X10(3) UL (ref 0–0.2)
BASOPHILS NFR BLD AUTO: 0.8 %
BILIRUB SERPL-MCNC: 0.7 MG/DL (ref 0.3–1.2)
BUN BLD-MCNC: 13 MG/DL (ref 9–23)
BUN/CREAT SERPL: 14 (ref 10–20)
CALCIUM BLD-MCNC: 9.3 MG/DL (ref 8.7–10.4)
CHLORIDE SERPL-SCNC: 106 MMOL/L (ref 98–112)
CHOLEST SERPL-MCNC: 175 MG/DL (ref ?–200)
CO2 SERPL-SCNC: 31 MMOL/L (ref 21–32)
CREAT BLD-MCNC: 0.93 MG/DL
CREAT UR-SCNC: 45 MG/DL
DEPRECATED RDW RBC AUTO: 41.8 FL (ref 35.1–46.3)
EGFRCR SERPLBLD CKD-EPI 2021: 108 ML/MIN/1.73M2 (ref 60–?)
EOSINOPHIL # BLD AUTO: 0.11 X10(3) UL (ref 0–0.7)
EOSINOPHIL NFR BLD AUTO: 1.1 %
ERYTHROCYTE [DISTWIDTH] IN BLOOD BY AUTOMATED COUNT: 12.1 % (ref 11–15)
EST. AVERAGE GLUCOSE BLD GHB EST-MCNC: 166 MG/DL (ref 68–126)
FASTING PATIENT LIPID ANSWER: YES
FASTING STATUS PATIENT QL REPORTED: YES
GLOBULIN PLAS-MCNC: 3.1 G/DL (ref 2–3.5)
GLUCOSE BLD-MCNC: 74 MG/DL (ref 70–99)
HBA1C MFR BLD: 7.4 % (ref ?–5.7)
HCT VFR BLD AUTO: 46.1 %
HDLC SERPL-MCNC: 33 MG/DL (ref 40–59)
HGB BLD-MCNC: 15.7 G/DL
IMM GRANULOCYTES # BLD AUTO: 0.03 X10(3) UL (ref 0–1)
IMM GRANULOCYTES NFR BLD: 0.3 %
LDLC SERPL CALC-MCNC: 122 MG/DL (ref ?–100)
LYMPHOCYTES # BLD AUTO: 3.87 X10(3) UL (ref 1–4)
LYMPHOCYTES NFR BLD AUTO: 37.8 %
MCH RBC QN AUTO: 31.7 PG (ref 26–34)
MCHC RBC AUTO-ENTMCNC: 34.1 G/DL (ref 31–37)
MCV RBC AUTO: 92.9 FL
MICROALBUMIN UR-MCNC: <0.3 MG/DL
MONOCYTES # BLD AUTO: 0.67 X10(3) UL (ref 0.1–1)
MONOCYTES NFR BLD AUTO: 6.5 %
NEUTROPHILS # BLD AUTO: 5.48 X10 (3) UL (ref 1.5–7.7)
NEUTROPHILS # BLD AUTO: 5.48 X10(3) UL (ref 1.5–7.7)
NEUTROPHILS NFR BLD AUTO: 53.5 %
NONHDLC SERPL-MCNC: 142 MG/DL (ref ?–130)
OSMOLALITY SERPL CALC.SUM OF ELEC: 295 MOSM/KG (ref 275–295)
PLATELET # BLD AUTO: 210 10(3)UL (ref 150–450)
POTASSIUM SERPL-SCNC: 3.9 MMOL/L (ref 3.5–5.1)
PROT SERPL-MCNC: 7.5 G/DL (ref 5.7–8.2)
RBC # BLD AUTO: 4.96 X10(6)UL
SODIUM SERPL-SCNC: 143 MMOL/L (ref 136–145)
TRIGL SERPL-MCNC: 107 MG/DL (ref 30–149)
VLDLC SERPL CALC-MCNC: 19 MG/DL (ref 0–30)
WBC # BLD AUTO: 10.2 X10(3) UL (ref 4–11)

## 2025-03-07 PROCEDURE — 83036 HEMOGLOBIN GLYCOSYLATED A1C: CPT

## 2025-03-07 PROCEDURE — 82043 UR ALBUMIN QUANTITATIVE: CPT

## 2025-03-07 PROCEDURE — 80053 COMPREHEN METABOLIC PANEL: CPT

## 2025-03-07 PROCEDURE — 36415 COLL VENOUS BLD VENIPUNCTURE: CPT

## 2025-03-07 PROCEDURE — 85025 COMPLETE CBC W/AUTO DIFF WBC: CPT

## 2025-03-07 PROCEDURE — 80061 LIPID PANEL: CPT

## 2025-03-07 PROCEDURE — 82570 ASSAY OF URINE CREATININE: CPT

## 2025-03-10 RX ORDER — OMEPRAZOLE 40 MG/1
40 CAPSULE, DELAYED RELEASE ORAL DAILY
Qty: 90 CAPSULE | Refills: 3 | Status: SHIPPED | OUTPATIENT
Start: 2025-03-10 | End: 2026-03-05

## 2025-03-10 RX ORDER — GLIPIZIDE 10 MG/1
10 TABLET, FILM COATED, EXTENDED RELEASE ORAL DAILY
Qty: 90 TABLET | Refills: 3 | Status: SHIPPED | OUTPATIENT
Start: 2025-03-10

## 2025-03-10 NOTE — TELEPHONE ENCOUNTER
Rx failed protocol. Please review     Omeprazole marked external, prescription from Dr. Solis  on 2024.    Glipizide prescription  on 2024.

## 2025-05-23 DIAGNOSIS — E11.9 TYPE 2 DIABETES MELLITUS WITHOUT COMPLICATION, WITHOUT LONG-TERM CURRENT USE OF INSULIN (HCC): ICD-10-CM

## 2025-05-27 RX ORDER — TIRZEPATIDE 7.5 MG/.5ML
INJECTION, SOLUTION SUBCUTANEOUS
Refills: 0 | OUTPATIENT
Start: 2025-05-27

## 2025-05-27 NOTE — TELEPHONE ENCOUNTER
Disp Refills Start End    Tirzepatide (MOUNJARO) 7.5 MG/0.5ML Subcutaneous Solution Auto-injector 6 mL 3 1/31/2025 --    Sig - Route: Inject 7.5 mg into the skin every 7 days. - Subcutaneous    Sent to pharmacy as: Mounjaro 7.5 MG/0.5ML Subcutaneous Solution Auto-injector (Tirzepatide)    E-Prescribing Status: Receipt confirmed by pharmacy (1/31/2025  9:49 AM CST)      Associated Diagnoses    Type 2 diabetes mellitus without complication, without long-term current use of insulin ([facility])        Pharmacy    [facility] - [address] [place] [phone], [phone]

## 2025-06-18 RX ORDER — GLIPIZIDE 10 MG/1
10 TABLET, FILM COATED, EXTENDED RELEASE ORAL DAILY
Qty: 90 TABLET | Refills: 3 | Status: SHIPPED | OUTPATIENT
Start: 2025-06-18

## 2025-06-18 RX ORDER — OMEPRAZOLE 40 MG/1
40 CAPSULE, DELAYED RELEASE ORAL DAILY
Qty: 90 CAPSULE | Refills: 3 | Status: SHIPPED | OUTPATIENT
Start: 2025-06-18

## 2025-06-18 RX ORDER — ROSUVASTATIN CALCIUM 20 MG/1
20 TABLET, COATED ORAL NIGHTLY
Qty: 90 TABLET | Refills: 3 | Status: SHIPPED | OUTPATIENT
Start: 2025-06-18

## 2025-06-18 NOTE — TELEPHONE ENCOUNTER
Patient would like refill on Rx glipizide, omeprazole, and rosuvastatin. Patient said he no longer wants those medications send to mail order. Patient said he has 3 tablets left on Rx glipizide. Patient aware that 90 day supply may not be approved and he is aware he may have to call every month for refills Please advise       Verified pharmacy [facility] in [facility]         Current Outpatient Medications   Medication Sig Dispense Refill    glipiZIDE ER 10 MG Oral Tablet 24 Hr Take 1 tablet (10 mg total) by mouth daily. 90 tablet 3    Omeprazole 40 MG Oral Capsule Delayed Release Take 1 capsule (40 mg total) by mouth daily. 90 capsule 3    rosuvastatin 20 MG Oral Tab Take 1 tablet (20 mg total) by mouth nightly. 90 tablet 3       3

## 2025-07-10 DIAGNOSIS — E11.9 TYPE 2 DIABETES MELLITUS WITHOUT COMPLICATION, WITHOUT LONG-TERM CURRENT USE OF INSULIN (HCC): ICD-10-CM

## 2025-07-15 NOTE — TELEPHONE ENCOUNTER
empagliflozin (JARDIANCE) 25 MG Oral Tab, Take 1 tablet (25 mg total) by mouth every morning., Disp: 90 tablet, Rfl: 3    
Duplicate Refill request/refill too soon.     empagliflozin (JARDIANCE) 25 MG - 1 year supply sent 10/22/24 to [facility]   
none...

## 2025-07-21 DIAGNOSIS — I10 ESSENTIAL HYPERTENSION: ICD-10-CM

## 2025-07-21 DIAGNOSIS — I10 HYPERTENSION GOAL BP (BLOOD PRESSURE) < 130/80: ICD-10-CM

## 2025-07-21 RX ORDER — HYDROCHLOROTHIAZIDE 25 MG/1
25 TABLET ORAL DAILY
Qty: 90 TABLET | Refills: 1 | Status: SHIPPED | OUTPATIENT
Start: 2025-07-21 | End: 2025-07-21

## (undated) NOTE — LETTER
3/10/2020              Ilya Coyne. [address] [facility] [address]         To Whom It May Concern,    Doug Javier is currently under my medical care.  He is currently taking Januvia 100 mg PO QD, and Jardiance 25 mg

## (undated) NOTE — LETTER
1/11/2019              Parag Ortega. [facility] [address] [address]         To Whom It May Concern,    Parag Ortega., is currently under my Medical care.  This letter is to certify that medication for Clonazepam has b

## (undated) NOTE — LETTER
12/14/2018              Yoan Pardo. Merlin         To Whom It May Concern,    Tony Singleton is currently under my Medical care. This letter is to certify that Salo Landon has been compliant with his treatment, and his follow up visits for his Diabetes being seen at least twice a year. Salo Landon has NOT had any Hypoglycemic reactions. He is cleared to drive a commercial vehicle. Should you require further information please contact our office. Sincerely,    MD Justin Morales José , [address] [place] [id number]        Document electronically generated by:  Suzanne Painter

## (undated) NOTE — LETTER
3/28/2019              Ilya Coyne. [address] [facility] [address]         To Whom It May Concern,    Ilya Coyne, is currently under my Medical care.  Dewey Sellers has been seen for his Diabetes and Hypertension every 6

## (undated) NOTE — LETTER
3/19/2020          To Whom It May Concern:    Nia Haas. is currently under my medical care . Edin Huang His HbA1C was 7.8% on 03/14/2020. If you require additional information please contact our office.         Sincerely,    Willa Mclaughlin PA-C

## (undated) NOTE — LETTER
9/7/2024          To Whom It May Concern:    Elvis Eldridge Jr. is currently under my medical care and may not return to work at this time.      He may return to work on 09/09/2024.  Activity is restricted as follows: none.    If you require additional information please contact our office.        Sincerely,    Theron Solis PA-C          Document generated by:  Theron Solis PA-C

## (undated) NOTE — LETTER
11/23/20        [address] [place] [address]      Dear Andry Reece,    [facility] records indicate that you have outstanding lab work and or testing that was ordered for you and has not yet been completed:  Orders Placed This Encounter

## (undated) NOTE — LETTER
3/19/2020          To Whom It May Concern:    Nia Haas. is currently under my medical care and may not return to work at this time. Please excuse Khris Sahni for 1 day. He may return to work on 03/20/2020.   Activity is restricted as follows: non

## (undated) NOTE — LETTER
08/10/20        [address] [place] [address].   Via Bryce Marquez      Dear Félix Duarte records indicate that you have outstanding lab work and or testing that was ordered for you and has not yet been completed:  Orders Placed This Encou

## (undated) NOTE — LETTER
2/26/2025        Elvis Eldridge Jr.        [address] [facility] [address]        To Whom It May Concern:    Elvis Eldridge Jr. is currently under my medical care. Patient was diagnosed with essential hypertension, hyperlipidemia, type 2 diabetic and obstructive sleep apnea. Patient taking medications as directed.     If you require additional information please contact our office.    Sincerely,        Theron Solis PA-C  [address] [place] [address]  Ph: [phone]  Fax: [phone]

## (undated) NOTE — LETTER
12/30/2022          To Whom It May Concern:    Danie Vick. is currently under my medical care and was seen in my office on 12/30/22. He had no foot concerns at this evaluation  If you require additional information please contact our office.         Sincerely,    Nayla Gaspar DPM

## (undated) NOTE — LETTER
Date & Time: 1/27/2025, 10:48 AM  Patient: Elvis Eldridge Jr.  Encounter Provider(s):    Alana Agustin APRN       To Whom It May Concern:    Elvis Eldridge was seen and treated in our department on 1/27/2025. He should not return to work until 01/30/2025 .    If you have any questions or concerns, please do not hesitate to call.    QUINN Krueger    _____________________________  Physician/APC Signature

## (undated) NOTE — LETTER
3/21/2019              Claritza Mcclellan. [address]         To Whom it May Concern:    Sully Myles.  ( 10/15/1985) is currently treated by me in the [address] for Obstructive Sleep Ap

## (undated) NOTE — LETTER
February 28, 2020         Janis Clark MD  [facility] [address] [place] [address]      Patient: Jena Rosen.    YOB: 1985   Date of Visit: 2/28/2020       Dear Dr. Yari Swartz MD,    I saw your patient, Jena Rosen., on

## (undated) NOTE — LETTER
3/8/2021          To Whom It May Concern:    Nuris Harley. is currently under my medical care and was seen in my office on 12/18/21. He had no foot concerns at that evaluation. If you require additional information please contact our office.

## (undated) NOTE — LETTER
Patient Name: Valeria Cooper   : 10/15/1985  MRN: [id number]  Patient Address: [address] [place] [address]       Coronavirus Disease 2019 (COVID-19)     [facility] is committed to the safety and well-being of our patients, member carefully. If your symptoms get worse, call your healthcare provider immediately. 3. Get rest and stay hydrated.    4. If you have a medical appointment, call the healthcare provider ahead of time and tell them that you have or may have COVID-19.  5. For m of fever-reducing medications; and  · Improvement in respiratory symptoms (e.g., cough, shortness of breath); and  · At least 10 days have passed since symptoms first appeared OR if asymptomatic patient or date of symptom onset is unclear then use 10 days donors must:    · Have had a confirmed diagnosis of COVID-19  · Be symptom-free for at least 14 days*    *Some people will be required to have a repeat COVID-19 test in order to be eligible to donate.  If you’re instructed by Kassie that a repeat test is r random. Researchers are trying to identify similarities between people with a Post-COVID condition to better understand if there are risk factors. How do I prevent a Post-COVID condition?   The best way to prevent the long-term symptoms of COVID-19 is

## (undated) NOTE — LETTER
9/20/2019          To Whom It May Concern:    Jena Silas. is currently under my medical care. He has been compliant with medical advice, medications, and follow-up. 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    If

## (undated) NOTE — LETTER
2/28/2023          To Whom It May Concern:    Leo Sanchez. is currently under my medical care. Patient was diagnosed with essential hypertension, hyperlipidemia, type 2 diabetic and obstructive sleep apnea. Patient taking medications as directed. If you require additional information please contact our office.         Sincerely,    Adelle Ahumada, PA-C

## (undated) NOTE — LETTER
2023              [address] [address]         To Whom It May Concern:        Saloni Stinson. (: 10/15/1985) is currently treated by me in the [address] for Obstructive Sleep Apnea. He is compliant with his CPAP and is benefiting from his CPAP therapy. Please contact my office at [phone] if you have any further questions.         Sincerely,    DO PRICILLA [facility], [address] [place] [address]  [address]  [address] [facility] [address] [phone]

## (undated) NOTE — LETTER
Lissy Castro DPM   Physician   PODIATRIST   Progress Notes      Signed   Encounter Date:  2/28/2020               Signed             HPI:    Patient ID: Vini Tracey. is a 30year old male.   This pleasant 31-year-old male presents as a are dystrophic although properly cared for. Hygiene is excellent his shoes are not a source of concern. Testing does not demonstrate loss of sensation and I see no evidence of weakness. At present I see no concerns in reference to diabetes.   I cautioned

## (undated) NOTE — LETTER
05/12/21        [facility] [facility] [address]      Dear Brenda Sam,    [facility] records indicate that you have outstanding lab work and or testing that was ordered for you and has not yet been completed:  Orders Placed This Encounter

## (undated) NOTE — LETTER
To Whom It May Concern:    Elvis RIA Eldridge Jr. is currently under my medical care and may return to work with no restrictions.    If you require additional information please contact our office.      Sincerely,          Beverly Uribe, DO  [facility], [facility], [place]  [address] [facility] [address]  [facility] [address] [phone]

## (undated) NOTE — LETTER
2/8/2024              Elvis Eldridge Jr.        [address] [facility] [address]         To whom it may concern,    Elvis Eldridge (10/15/1985) is currently under my care for sleep apnea. I have reviewed his CPAP data and he is compliant with CPAP therapy.          Sincerely,    Beverly Uribe, DO  [facility], [facility], [place]  [address] [facility] [address]  [facility] [address] [phone]

## (undated) NOTE — LETTER
09/30/21        [facility] [facility] [address]      Dear Dewey Sellers,    [address] [facility] records indicate that you have outstanding lab work and or testing that was ordered for you and has not yet been completed:  Orders Placed This Encounter